# Patient Record
Sex: MALE | Race: WHITE | Employment: FULL TIME | URBAN - NONMETROPOLITAN AREA
[De-identification: names, ages, dates, MRNs, and addresses within clinical notes are randomized per-mention and may not be internally consistent; named-entity substitution may affect disease eponyms.]

---

## 2018-04-30 LAB
AVERAGE GLUCOSE: NORMAL
CHOLESTEROL, TOTAL: 278 MG/DL
CHOLESTEROL/HDL RATIO: 7.1
CREATININE: 1.2 MG/DL
HBA1C MFR BLD: 6.2 %
HDLC SERPL-MCNC: 39 MG/DL (ref 35–70)
LDL CHOLESTEROL CALCULATED: 198 MG/DL (ref 0–160)
POTASSIUM (K+): 4.1
TRIGL SERPL-MCNC: 218 MG/DL
VLDLC SERPL CALC-MCNC: ABNORMAL MG/DL

## 2019-06-25 SDOH — HEALTH STABILITY: MENTAL HEALTH: HOW OFTEN DO YOU HAVE A DRINK CONTAINING ALCOHOL?: MONTHLY OR LESS

## 2019-06-25 SDOH — HEALTH STABILITY: MENTAL HEALTH: HOW MANY STANDARD DRINKS CONTAINING ALCOHOL DO YOU HAVE ON A TYPICAL DAY?: 1 OR 2

## 2019-07-08 VITALS
HEIGHT: 70 IN | HEART RATE: 70 BPM | SYSTOLIC BLOOD PRESSURE: 122 MMHG | WEIGHT: 240 LBS | DIASTOLIC BLOOD PRESSURE: 70 MMHG | BODY MASS INDEX: 34.36 KG/M2

## 2019-07-08 RX ORDER — ALPRAZOLAM 0.25 MG/1
0.25 TABLET ORAL 3 TIMES DAILY PRN
COMMUNITY
End: 2019-07-09 | Stop reason: SDUPTHER

## 2019-07-09 ENCOUNTER — OFFICE VISIT (OUTPATIENT)
Dept: PRIMARY CARE CLINIC | Age: 54
End: 2019-07-09
Payer: COMMERCIAL

## 2019-07-09 VITALS
SYSTOLIC BLOOD PRESSURE: 122 MMHG | RESPIRATION RATE: 16 BRPM | BODY MASS INDEX: 34.07 KG/M2 | WEIGHT: 238 LBS | OXYGEN SATURATION: 98 % | HEIGHT: 70 IN | DIASTOLIC BLOOD PRESSURE: 80 MMHG | TEMPERATURE: 98.4 F | HEART RATE: 74 BPM

## 2019-07-09 DIAGNOSIS — E78.2 MIXED HYPERLIPIDEMIA: ICD-10-CM

## 2019-07-09 DIAGNOSIS — Z51.81 ENCOUNTER FOR MEDICATION MONITORING: ICD-10-CM

## 2019-07-09 DIAGNOSIS — G47.33 OBSTRUCTIVE SLEEP APNEA SYNDROME: ICD-10-CM

## 2019-07-09 DIAGNOSIS — E29.1 HYPOGONADISM IN MALE: ICD-10-CM

## 2019-07-09 DIAGNOSIS — F41.9 ANXIETY: Primary | ICD-10-CM

## 2019-07-09 PROCEDURE — 99214 OFFICE O/P EST MOD 30 MIN: CPT | Performed by: INTERNAL MEDICINE

## 2019-07-09 RX ORDER — ALPRAZOLAM 0.25 MG/1
0.25 TABLET ORAL 3 TIMES DAILY PRN
Qty: 90 TABLET | Refills: 2 | Status: SHIPPED | OUTPATIENT
Start: 2019-07-09 | End: 2019-08-08

## 2019-07-09 ASSESSMENT — PATIENT HEALTH QUESTIONNAIRE - PHQ9
1. LITTLE INTEREST OR PLEASURE IN DOING THINGS: 1
SUM OF ALL RESPONSES TO PHQ9 QUESTIONS 1 & 2: 1
2. FEELING DOWN, DEPRESSED OR HOPELESS: 0
SUM OF ALL RESPONSES TO PHQ QUESTIONS 1-9: 1
SUM OF ALL RESPONSES TO PHQ QUESTIONS 1-9: 1

## 2019-07-09 ASSESSMENT — ENCOUNTER SYMPTOMS
COLOR CHANGE: 0
CHEST TIGHTNESS: 0
DIARRHEA: 0
NAUSEA: 0
BACK PAIN: 0
CONSTIPATION: 0
SHORTNESS OF BREATH: 0
ABDOMINAL PAIN: 0

## 2019-07-09 NOTE — PROGRESS NOTES
tightness and shortness of breath. Cardiovascular: Negative for chest pain, palpitations and leg swelling. Gastrointestinal: Negative for abdominal pain, constipation, diarrhea and nausea. Endocrine: Negative for cold intolerance and heat intolerance. Genitourinary: Negative for difficulty urinating, dysuria, hematuria and urgency. Musculoskeletal: Negative for arthralgias and back pain. Skin: Negative for color change and pallor. Neurological: Negative for dizziness, seizures, weakness, light-headedness, numbness and headaches. Hematological: Does not bruise/bleed easily. Psychiatric/Behavioral: Negative for confusion and sleep disturbance. The patient is nervous/anxious. Current Outpatient Medications:     ALPRAZolam (XANAX) 0.25 MG tablet, Take 1 tablet by mouth 3 times daily as needed for Sleep or Anxiety for up to 30 days. , Disp: 90 tablet, Rfl: 2    Multiple Vitamin (MULTI-DAY PO), Take by mouth, Disp: , Rfl:   Medications Discontinued During This Encounter   Medication Reason    ALPRAZolam (XANAX) 0.25 MG tablet REORDER       Allergies   Allergen Reactions    Buspar [Buspirone]     Lexapro  [Escitalopram Oxalate]     Venlafaxine     Zoloft  [Sertraline Hcl]        Past Medical History:   Diagnosis Date    Anxiety     Depression     Erectile dysfunction     Hyperlipidemia     Hypogonadism in male     Sleep apnea         There is no immunization history on file for this patient. Vitals:    07/09/19 0745   BP: 122/80   Site: Left Upper Arm   Position: Sitting   Cuff Size: Large Adult   Pulse: 74   Resp: 16   Temp: 98.4 °F (36.9 °C)   TempSrc: Temporal   SpO2: 98%   Weight: 238 lb (108 kg)   Height: 5' 10\" (1.778 m)       Physical Exam   Constitutional: He is oriented to person, place, and time. He appears well-developed and well-nourished. No distress. HENT:   Head: Normocephalic and atraumatic.    Right Ear: External ear normal.   Left Ear: External ear normal.

## 2019-10-01 RX ORDER — MULTIVITAMIN WITH IRON
250 TABLET ORAL DAILY
COMMUNITY
End: 2019-10-09

## 2019-10-01 RX ORDER — ALPRAZOLAM 0.25 MG/1
0.25 TABLET ORAL 3 TIMES DAILY PRN
COMMUNITY
End: 2019-10-09 | Stop reason: SDUPTHER

## 2019-10-08 ASSESSMENT — ENCOUNTER SYMPTOMS
BACK PAIN: 0
DIARRHEA: 0
CONSTIPATION: 0
COLOR CHANGE: 0
CHEST TIGHTNESS: 0
NAUSEA: 0
ABDOMINAL PAIN: 0
SHORTNESS OF BREATH: 0

## 2019-10-09 ENCOUNTER — OFFICE VISIT (OUTPATIENT)
Dept: PRIMARY CARE CLINIC | Age: 54
End: 2019-10-09
Payer: COMMERCIAL

## 2019-10-09 VITALS
SYSTOLIC BLOOD PRESSURE: 116 MMHG | WEIGHT: 235 LBS | RESPIRATION RATE: 16 BRPM | TEMPERATURE: 98 F | BODY MASS INDEX: 33.64 KG/M2 | DIASTOLIC BLOOD PRESSURE: 74 MMHG | OXYGEN SATURATION: 96 % | HEIGHT: 70 IN | HEART RATE: 80 BPM

## 2019-10-09 DIAGNOSIS — G47.33 OBSTRUCTIVE SLEEP APNEA SYNDROME: Primary | ICD-10-CM

## 2019-10-09 DIAGNOSIS — F41.9 ANXIETY: ICD-10-CM

## 2019-10-09 DIAGNOSIS — E78.2 MIXED HYPERLIPIDEMIA: ICD-10-CM

## 2019-10-09 DIAGNOSIS — Z12.5 SCREENING FOR PROSTATE CANCER: ICD-10-CM

## 2019-10-09 PROCEDURE — 99213 OFFICE O/P EST LOW 20 MIN: CPT | Performed by: INTERNAL MEDICINE

## 2019-10-09 RX ORDER — ALPRAZOLAM 0.25 MG/1
0.25 TABLET ORAL 3 TIMES DAILY PRN
Qty: 90 TABLET | Refills: 2 | Status: SHIPPED | OUTPATIENT
Start: 2019-10-09 | End: 2020-01-07

## 2019-11-08 PROBLEM — Z12.5 SCREENING FOR PROSTATE CANCER: Status: RESOLVED | Noted: 2019-10-09 | Resolved: 2019-11-08

## 2020-01-09 PROBLEM — N52.9 ERECTILE DYSFUNCTION: Status: ACTIVE | Noted: 2020-01-09

## 2020-01-29 ASSESSMENT — ENCOUNTER SYMPTOMS
COLOR CHANGE: 0
CONSTIPATION: 0
NAUSEA: 0
CHEST TIGHTNESS: 0
SHORTNESS OF BREATH: 0
BACK PAIN: 0
ABDOMINAL PAIN: 0
DIARRHEA: 0

## 2020-01-29 NOTE — PROGRESS NOTES
19    Name: Mildred Casillas     :1965      Sex:male       Age : 49 y.o. Chief Complaint   Patient presents with    Hyperlipidemia     3 MO CHECK UP ; Patient is fasting.  Anxiety     medication refill        This 47y.o. -year-old male  presents today for evaluation and management of his  chronic medical problems. Current medication list reviewed. The patient is tolerating all medications well without adverse events or known side effects. The patient does understand the risk and benefits of the prescribed medications. Patient has a history of anxiety. He texted takes alprazolam on a regular basis 0.25 mg 3 times daily. We discussed the use of medications such as Cymbalta or Effexor which he says he can take based on his gene study. Other ones that he is not supposed to take include all the regular ones such as Wellbutrin Zoloft Paxil Prozac Lexapro and Celexa. He signed his medication agreement. This was reviewed with him. We reviewed his controlled substance monitoring. He signed his medication agreement. He is aware of the possible medication side effects tolerance, addiction. He does not want to go on any other medications at this time. No sign of drug seeking or diversion identified. We sent a random urine drug screen today. [place] report reviewed and the last time he had his prescription filled was on  2019       He did some type of gene sight test for psychotropic medications and he gave me a copy of the report. Alprazolam was 1 of the medications that was acceptable. He did have a decreased folic acid level according to him and so  he started on over-the-counter folate  Daily. Elieser Quirozots He does not want to have a colonoscopy. He did FIT tests within the year. He last saw his urologist about a year ago. He had a penile implant about a year ago which works well. He has a history of obstructive sleep apnea and uses a CPAP nightly  .   Patient refuses flu shot and colonoscopy        Review of Systems   Constitutional: Negative for fatigue. Respiratory: Negative for chest tightness and shortness of breath. Cardiovascular: Negative for chest pain, palpitations and leg swelling. Gastrointestinal: Negative for abdominal pain, constipation, diarrhea and nausea. Endocrine: Negative for cold intolerance and heat intolerance. Genitourinary: Negative for difficulty urinating, dysuria, hematuria and urgency. Musculoskeletal: Negative for arthralgias and back pain. Skin: Negative for color change and pallor. Neurological: Negative for dizziness, seizures, weakness, light-headedness, numbness and headaches. Hematological: Does not bruise/bleed easily. Psychiatric/Behavioral: Negative for confusion and sleep disturbance. The patient is nervous/anxious. Current Outpatient Medications:     NONFORMULARY, CBD oil, Disp: , Rfl:     ALPRAZolam (XANAX) 0.25 MG tablet, Take 1 tablet by mouth 3 times daily as needed for Anxiety for up to 30 days. , Disp: 90 tablet, Rfl: 3    Multiple Vitamin (MULTI-DAY PO), Take by mouth, Disp: , Rfl:   Medications Discontinued During This Encounter   Medication Reason    ALPRAZolam (XANAX) 0.25 MG tablet REORDER    ALPRAZolam (XANAX) 0.25 MG tablet        Allergies   Allergen Reactions    Buspar [Buspirone]     Lexapro  [Escitalopram Oxalate]     Venlafaxine     Zoloft  [Sertraline Hcl]        Past Medical History:   Diagnosis Date    Anxiety     Depression     Erectile dysfunction     Hyperlipidemia     Hypogonadism in male     Sleep apnea         There is no immunization history on file for this patient. Vitals:    01/30/20 0805   BP: 128/84   Site: Left Upper Arm   Position: Sitting   Cuff Size: Large Adult   Pulse: 84   Resp: 16   Temp: 98 °F (36.7 °C)   TempSrc: Temporal   SpO2: 98%   Weight: 237 lb 3.2 oz (107.6 kg)   Height: 5' 10\" (1.778 m)       Physical Exam  Vitals signs reviewed.    Constitutional:

## 2020-01-30 ENCOUNTER — OFFICE VISIT (OUTPATIENT)
Dept: PRIMARY CARE CLINIC | Age: 55
End: 2020-01-30
Payer: COMMERCIAL

## 2020-01-30 VITALS
HEIGHT: 70 IN | DIASTOLIC BLOOD PRESSURE: 84 MMHG | BODY MASS INDEX: 33.96 KG/M2 | OXYGEN SATURATION: 98 % | SYSTOLIC BLOOD PRESSURE: 128 MMHG | WEIGHT: 237.2 LBS | HEART RATE: 84 BPM | TEMPERATURE: 98 F | RESPIRATION RATE: 16 BRPM

## 2020-01-30 PROCEDURE — 99213 OFFICE O/P EST LOW 20 MIN: CPT | Performed by: INTERNAL MEDICINE

## 2020-01-30 RX ORDER — ALPRAZOLAM 0.25 MG/1
1 TABLET ORAL 3 TIMES DAILY PRN
COMMUNITY
Start: 2020-01-12 | End: 2020-01-30 | Stop reason: SDUPTHER

## 2020-01-30 RX ORDER — ALPRAZOLAM 0.25 MG/1
0.25 TABLET ORAL 3 TIMES DAILY PRN
Qty: 90 TABLET | Refills: 0 | Status: SHIPPED | OUTPATIENT
Start: 2020-01-30 | End: 2020-01-30

## 2020-01-30 RX ORDER — ALPRAZOLAM 0.25 MG/1
0.25 TABLET ORAL 3 TIMES DAILY PRN
Qty: 90 TABLET | Refills: 3 | Status: SHIPPED | OUTPATIENT
Start: 2020-01-30 | End: 2020-02-29

## 2020-01-30 ASSESSMENT — PATIENT HEALTH QUESTIONNAIRE - PHQ9
1. LITTLE INTEREST OR PLEASURE IN DOING THINGS: 1
SUM OF ALL RESPONSES TO PHQ QUESTIONS 1-9: 2
SUM OF ALL RESPONSES TO PHQ QUESTIONS 1-9: 2
SUM OF ALL RESPONSES TO PHQ9 QUESTIONS 1 & 2: 2
2. FEELING DOWN, DEPRESSED OR HOPELESS: 1

## 2020-02-29 PROBLEM — Z12.5 SCREENING FOR PROSTATE CANCER: Status: RESOLVED | Noted: 2019-10-09 | Resolved: 2020-02-29

## 2020-04-30 ENCOUNTER — VIRTUAL VISIT (OUTPATIENT)
Dept: PRIMARY CARE CLINIC | Age: 55
End: 2020-04-30
Payer: COMMERCIAL

## 2020-04-30 PROCEDURE — 99213 OFFICE O/P EST LOW 20 MIN: CPT | Performed by: INTERNAL MEDICINE

## 2020-04-30 RX ORDER — ALPRAZOLAM 0.25 MG/1
TABLET ORAL
Qty: 90 TABLET | Refills: 2 | Status: SHIPPED | OUTPATIENT
Start: 2020-04-30 | End: 2020-05-30

## 2020-04-30 RX ORDER — ALPRAZOLAM 0.25 MG/1
TABLET ORAL
COMMUNITY
Start: 2020-04-13 | End: 2020-04-30 | Stop reason: SDUPTHER

## 2020-04-30 ASSESSMENT — ENCOUNTER SYMPTOMS
SHORTNESS OF BREATH: 0
CHEST TIGHTNESS: 0
DIARRHEA: 0
ABDOMINAL PAIN: 0
CONSTIPATION: 0
COLOR CHANGE: 0
NAUSEA: 0
BACK PAIN: 0

## 2020-04-30 NOTE — PROGRESS NOTES
19    Name: Candy Valdez     :1965      Sex:male       Age : 49 y.o. Chief Complaint   Patient presents with    Sleep Apnea        This 47y.o. -year-old male  presents today for evaluation and management of his  chronic medical problems. Current medication list reviewed. The patient is tolerating all medications well without adverse events or known side effects. The patient does understand the risk and benefits of the prescribed medications. Patient has a history of anxiety. He  takes alprazolam on a regular basis 0.25 mg 3 times daily. We discussed the use of medications such as Cymbalta or Effexor which he says he can take based on his gene study. Other ones that he is not supposed to take include all the regular ones such as Wellbutrin Zoloft Paxil Prozac Lexapro and Celexa. He does not want to take an SSRI or SNRI. He signed his medication agreement. We reviewed his controlled substance monitoring. He signed his medication agreement. He is aware of the possible medication side effects tolerance, addiction. He does not want to go on any other medications at this time. No sign of drug seeking or diversion identified. [place] report reviewed and the last time he had his prescription filled was on  2020. He did some type of gene sight test for psychotropic medications and he gave me a copy of the report. Alprazolam was 1 of the medications that was acceptable. He did have a decreased folic acid level according to him and so  he started on over-the-counter folate  Daily. Nathanael Mar He does not want to have a colonoscopy. He did FIT tests within the year. He last saw his urologist about a year ago. He had a penile implant about a year ago which works well. He has a history of obstructive sleep apnea and uses a CPAP nightly    He has a history of hyperlipidemia and we need to check his lipids  .   Patient refuses flu shot and colonoscopy    He will return within the month for fasting blood work. Review of Systems   Constitutional: Negative for fatigue. Respiratory: Negative for chest tightness and shortness of breath. Cardiovascular: Negative for chest pain, palpitations and leg swelling. Gastrointestinal: Negative for abdominal pain, constipation, diarrhea and nausea. Endocrine: Negative for cold intolerance and heat intolerance. Genitourinary: Negative for difficulty urinating, dysuria, hematuria and urgency. Musculoskeletal: Negative for arthralgias and back pain. Skin: Negative for color change and pallor. Neurological: Negative for dizziness, seizures, weakness, light-headedness, numbness and headaches. Hematological: Does not bruise/bleed easily. Psychiatric/Behavioral: Negative for confusion and sleep disturbance. The patient is not nervous/anxious. Current Outpatient Medications:     ALPRAZolam (XANAX) 0.25 MG tablet, TAKE 1 TABLET BY MOUTH 3 TIMES DAILY AS NEEDED FOR ANXIETY FOR UP TO 30 DAYS., Disp: 90 tablet, Rfl: 2    NONFORMULARY, CBD oil, Disp: , Rfl:     Multiple Vitamin (MULTI-DAY PO), Take by mouth, Disp: , Rfl:   Medications Discontinued During This Encounter   Medication Reason    ALPRAZolam (XANAX) 0.25 MG tablet REORDER       Allergies   Allergen Reactions    Buspar [Buspirone]     Lexapro  [Escitalopram Oxalate]     Venlafaxine     Zoloft  [Sertraline Hcl]        Past Medical History:   Diagnosis Date    Anxiety     Erectile dysfunction     Hyperlipidemia     Hypogonadism in male     Sleep apnea       Patient refuses all immunizations    There were no vitals filed for this visit. Physical Examination:    Constitutional:  He is alert, oriented x3, he is in no acute distress. HEENT:  Head is normal and atraumatic  EOM, no sign of eye discharge, sclerae normal  Neck no masses    Pulmonary:  No sign of respiratory distress.   He was not coughing    Skin:  No facial lesions    Neurological:  Gaze is substitute for in-person clinic visit. Patient and provider were located at their individual homes. Visit performed with doxy. me    --Eric Sutherland DO on 4/30/2020 at 8:51 AM    An electronic signature was used to authenticate this note.

## 2020-05-11 PROBLEM — M54.12 CERVICAL RADICULITIS: Status: ACTIVE | Noted: 2020-05-11

## 2020-05-11 RX ORDER — METHYLPREDNISOLONE 4 MG/1
TABLET ORAL
Qty: 1 KIT | Refills: 0 | Status: SHIPPED | OUTPATIENT
Start: 2020-05-11 | End: 2020-05-17

## 2020-05-30 PROBLEM — Z12.5 SCREENING FOR PROSTATE CANCER: Status: RESOLVED | Noted: 2019-10-09 | Resolved: 2020-05-30

## 2020-07-29 ASSESSMENT — ENCOUNTER SYMPTOMS
SHORTNESS OF BREATH: 0
BACK PAIN: 0
CONSTIPATION: 0
CHEST TIGHTNESS: 0
NAUSEA: 0
COLOR CHANGE: 0
ABDOMINAL PAIN: 0

## 2020-07-29 NOTE — PROGRESS NOTES
19    Name: Avi Goodman     :1965      Sex:male       Age : 49 y.o. Chief Complaint   Patient presents with    Sleep Apnea        This 47y.o. -year-old male  presents today for evaluation and management of his  chronic medical problems. Current medication list reviewed. The patient is tolerating all medications well without adverse events or known side effects. The patient does understand the risk and benefits of the prescribed medications. Patient has a history of anxiety. He texted takes alprazolam on a regular basis 0.25 mg 3 times daily. We discussed the use of medications such as Cymbalta or Effexor which he says he can take based on his gene study. Other ones that he is not supposed to take include all the regular ones such as Wellbutrin Zoloft Paxil Prozac Lexapro and Celexa. He signed his medication agreement. This was reviewed with him. We reviewed his controlled substance monitoring. He signed his medication agreement. He is aware of the possible medication side effects tolerance, addiction. He does not want to go on any other medications at this time. No sign of drug seeking or diversion identified. Hermila Camarillo He did some type of gene sight test for psychotropic medications and he gave me a copy of the report. Alprazolam was 1 of the medications that was acceptable. He did have a decreased folic acid level according to him and so  he started on over-the-counter folate  Daily    He has a history of anxiety mainly on the days that he works. He uses his alprazolam..As needed. [place] report reviewed. Last fill was on 2020. Of 90 pills. He does not want to have a colonoscopy. He did FIT tests within the year. He last saw his urologist about a year ago. He had a penile implant about a year ago which works well. He has a history of obstructive sleep apnea and uses a CPAP nightly  .   Patient refuses flu shot and colonoscopy    He had problems with his left ear plugging up. Over the last several weeks. He tried some Debrox which did not  help. Right ear is normal.  There is minimal left ear pain. He also has diarrhea over the last 2 weeks. No previous antibiotic therapy. No fever, chills, nausea or vomiting. He has minimal abdominal pain more like cramping and having a bowel movement usually relieves this pain. His stools are combination of loose and formed. He goes the bathroom about 2-3 times a day. No previous history of lactose or gluten intolerance. Review of Systems   Constitutional: Negative for fatigue. HENT: Positive for ear pain. Respiratory: Negative for chest tightness and shortness of breath. Cardiovascular: Negative for chest pain, palpitations and leg swelling. Gastrointestinal: Positive for diarrhea. Negative for abdominal pain, constipation and nausea. Endocrine: Negative for cold intolerance and heat intolerance. Genitourinary: Negative for difficulty urinating, dysuria, hematuria and urgency. Musculoskeletal: Negative for arthralgias and back pain. Skin: Negative for color change and pallor. Neurological: Negative for dizziness, seizures, weakness, light-headedness, numbness and headaches. Hematological: Does not bruise/bleed easily. Psychiatric/Behavioral: Negative for confusion and sleep disturbance. The patient is nervous/anxious.            Current Outpatient Medications:     ALPRAZolam (XANAX) 0.25 MG tablet, TAKE 1 TABLET BY MOUTH 3 TIMES DAILY AS NEEDED FOR ANXIETY FOR UP TO 30 DAYS., Disp: 90 tablet, Rfl: 2    dicyclomine (BENTYL) 10 MG capsule, Take 1 capsule by mouth 4 times daily Prn, Disp: 40 capsule, Rfl: 1    ciprofloxacin-hydrocortisone (CIPRO HC) 0.2-1 % otic suspension, Place 3 drops in ear(s) 2 times daily for 7 days, Disp: 1 Bottle, Rfl: 1    NONFORMULARY, CBD oil, Disp: , Rfl:     Multiple Vitamin (MULTI-DAY PO), Take by mouth, Disp: , Rfl:   Medications Discontinued During This Encounter   Medication Reason    ALPRAZolam (XANAX) 0.25 MG tablet REORDER       Allergies   Allergen Reactions    Buspar [Buspirone]     Lexapro  [Escitalopram Oxalate]     Venlafaxine     Zoloft  [Sertraline Hcl]        Past Medical History:   Diagnosis Date    Anxiety     Erectile dysfunction     Hyperlipidemia     Hypogonadism in male     Sleep apnea         There is no immunization history on file for this patient. Vitals:    07/30/20 0657   BP: 105/68   Pulse: 86   Temp: 97.3 °F (36.3 °C)   TempSrc: Temporal   SpO2: 96%   Weight: 235 lb (106.6 kg)   Height: 5' 10\" (1.778 m)       Physical Exam  Vitals signs reviewed. Constitutional:       General: He is not in acute distress. Appearance: He is well-developed. HENT:      Head: Normocephalic and atraumatic. Right Ear: Tympanic membrane, ear canal and external ear normal.      Left Ear: External ear normal.      Ears:      Comments: EAC is excoriated and red. TMs slightly retracted. Eyes:      Pupils: Pupils are equal, round, and reactive to light. Neck:      Musculoskeletal: Normal range of motion and neck supple. Thyroid: Thyromegaly present. Vascular: No JVD. Cardiovascular:      Rate and Rhythm: Normal rate and regular rhythm. Heart sounds: No murmur. Pulmonary:      Effort: Pulmonary effort is normal.      Breath sounds: Normal breath sounds. No wheezing or rales. Abdominal:      General: Bowel sounds are normal. There is no distension. Palpations: Abdomen is soft. Tenderness: There is no abdominal tenderness. Musculoskeletal: Normal range of motion. General: No deformity. Lymphadenopathy:      Cervical: No cervical adenopathy. Skin:     General: Skin is warm and dry. Coloration: Skin is not pale. Findings: No erythema. Neurological:      Mental Status: He is alert and oriented to person, place, and time. Motor: No abnormal muscle tone.       Deep Tendon Reflexes: Reflexes normal.   Psychiatric:         Behavior: Behavior normal.         Thought Content: Thought content normal.         Judgment: Judgment normal.          Problem List        Respiratory    Sleep apnea     Continue using his CPAP daily. It really helps him            Digestive    Irritable bowel syndrome with diarrhea      investigate if certain foods cause or worsen the diarrhea. We will try  dicyclomine 10 mg 4 times daily as needed abdominal pain or diarrhea. If the diarrhea persists over the next couple of weeks let me know and I will make a GI consult. Relevant Medications    dicyclomine (BENTYL) 10 MG capsule       Nervous and Auditory    Otitis externa     Trial Claritin-D for possible he is taking tube problems. Cipro otic 3 drops twice a day for 7 days in the left ear. If no improvement sent to ENT         Relevant Medications    ciprofloxacin-hydrocortisone (CIPRO HC) 0.2-1 % otic suspension       Other    Anxiety - Primary     OARRS report reviewed. Prescription management. Relevant Medications    ALPRAZolam (XANAX) 0.25 MG tablet    Hyperlipidemia     Watch saturated fats in diet and will monitor lipids         Relevant Orders    Lipid Panel    Comprehensive Metabolic Panel    Screening for prostate cancer     Check PSA         Relevant Orders    Psa screening    Hyperglycemia     Check hemoglobin A1c         Relevant Orders    Comprehensive Metabolic Panel    Hemoglobin A1C        Return in 3 months for medication refill and monitoring.     Seen by:   Jackie Lopez DO

## 2020-07-30 ENCOUNTER — HOSPITAL ENCOUNTER (OUTPATIENT)
Age: 55
Discharge: HOME | End: 2020-08-01
Payer: COMMERCIAL

## 2020-07-30 ENCOUNTER — OFFICE VISIT (OUTPATIENT)
Dept: PRIMARY CARE CLINIC | Age: 55
End: 2020-07-30
Payer: COMMERCIAL

## 2020-07-30 VITALS
SYSTOLIC BLOOD PRESSURE: 105 MMHG | HEART RATE: 86 BPM | TEMPERATURE: 97.3 F | BODY MASS INDEX: 33.64 KG/M2 | DIASTOLIC BLOOD PRESSURE: 68 MMHG | HEIGHT: 70 IN | OXYGEN SATURATION: 96 % | WEIGHT: 235 LBS

## 2020-07-30 PROBLEM — R73.9 HYPERGLYCEMIA: Status: ACTIVE | Noted: 2020-07-30

## 2020-07-30 PROBLEM — K58.0 IRRITABLE BOWEL SYNDROME WITH DIARRHEA: Status: ACTIVE | Noted: 2020-07-30

## 2020-07-30 PROBLEM — H60.90 OTITIS EXTERNA: Status: ACTIVE | Noted: 2020-07-30

## 2020-07-30 LAB
ALBUMIN SERPL-MCNC: 4.3 G/DL (ref 3.5–5.2)
ALP BLD-CCNC: 66 U/L (ref 40–129)
ALT SERPL-CCNC: 23 U/L (ref 0–40)
ANION GAP SERPL CALCULATED.3IONS-SCNC: 19 MMOL/L (ref 7–16)
AST SERPL-CCNC: 17 U/L (ref 0–39)
BILIRUB SERPL-MCNC: 0.4 MG/DL (ref 0–1.2)
BUN BLDV-MCNC: 17 MG/DL (ref 6–20)
CALCIUM SERPL-MCNC: 9.2 MG/DL (ref 8.6–10.2)
CHLORIDE BLD-SCNC: 101 MMOL/L (ref 98–107)
CHOLESTEROL, TOTAL: 261 MG/DL (ref 0–199)
CO2: 21 MMOL/L (ref 22–29)
CREAT SERPL-MCNC: 1.2 MG/DL (ref 0.7–1.2)
GFR AFRICAN AMERICAN: >60
GFR NON-AFRICAN AMERICAN: >60 ML/MIN/1.73
GLUCOSE BLD-MCNC: 138 MG/DL (ref 74–99)
HBA1C MFR BLD: 7.1 % (ref 4–5.6)
HDLC SERPL-MCNC: 42 MG/DL
LDL CHOLESTEROL CALCULATED: 190 MG/DL (ref 0–99)
POTASSIUM SERPL-SCNC: 4 MMOL/L (ref 3.5–5)
PROSTATE SPECIFIC ANTIGEN: 0.37 NG/ML (ref 0–4)
SODIUM BLD-SCNC: 141 MMOL/L (ref 132–146)
TOTAL PROTEIN: 7.6 G/DL (ref 6.4–8.3)
TRIGL SERPL-MCNC: 144 MG/DL (ref 0–149)
VLDLC SERPL CALC-MCNC: 29 MG/DL

## 2020-07-30 PROCEDURE — 83036 HEMOGLOBIN GLYCOSYLATED A1C: CPT

## 2020-07-30 PROCEDURE — 80061 LIPID PANEL: CPT

## 2020-07-30 PROCEDURE — G0103 PSA SCREENING: HCPCS

## 2020-07-30 PROCEDURE — 99213 OFFICE O/P EST LOW 20 MIN: CPT | Performed by: INTERNAL MEDICINE

## 2020-07-30 PROCEDURE — 80053 COMPREHEN METABOLIC PANEL: CPT

## 2020-07-30 PROCEDURE — 36415 COLL VENOUS BLD VENIPUNCTURE: CPT

## 2020-07-30 RX ORDER — DICYCLOMINE HYDROCHLORIDE 10 MG/1
10 CAPSULE ORAL 4 TIMES DAILY
Qty: 40 CAPSULE | Refills: 1 | Status: SHIPPED
Start: 2020-07-30 | End: 2020-10-21 | Stop reason: ALTCHOICE

## 2020-07-30 RX ORDER — ALPRAZOLAM 0.25 MG/1
TABLET ORAL
COMMUNITY
Start: 2020-06-27 | End: 2020-07-30 | Stop reason: SDUPTHER

## 2020-07-30 RX ORDER — CIPROFLOXACIN/HYDROCORTISONE 0.2 %-1 %
3 SUSPENSION, DROPS(FINAL DOSAGE FORM)(ML) OTIC (EAR) 2 TIMES DAILY
Qty: 1 BOTTLE | Refills: 1 | Status: SHIPPED | OUTPATIENT
Start: 2020-07-30 | End: 2020-08-06

## 2020-07-30 RX ORDER — ALPRAZOLAM 0.25 MG/1
TABLET ORAL
Qty: 90 TABLET | Refills: 2 | Status: SHIPPED | OUTPATIENT
Start: 2020-07-30 | End: 2020-08-30

## 2020-07-30 ASSESSMENT — ENCOUNTER SYMPTOMS: DIARRHEA: 1

## 2020-07-30 NOTE — ASSESSMENT & PLAN NOTE
investigate if certain foods cause or worsen the diarrhea. We will try  dicyclomine 10 mg 4 times daily as needed abdominal pain or diarrhea. If the diarrhea persists over the next couple of weeks let me know and I will make a GI consult.

## 2020-07-30 NOTE — ASSESSMENT & PLAN NOTE
Trial Claritin-D for possible he is taking tube problems. Cipro otic 3 drops twice a day for 7 days in the left ear.   If no improvement sent to ENT

## 2020-08-29 PROBLEM — Z12.5 SCREENING FOR PROSTATE CANCER: Status: RESOLVED | Noted: 2019-10-09 | Resolved: 2020-08-29

## 2020-10-21 ENCOUNTER — OFFICE VISIT (OUTPATIENT)
Dept: PRIMARY CARE CLINIC | Age: 55
End: 2020-10-21
Payer: COMMERCIAL

## 2020-10-21 VITALS
WEIGHT: 226 LBS | BODY MASS INDEX: 32.43 KG/M2 | OXYGEN SATURATION: 97 % | HEART RATE: 81 BPM | SYSTOLIC BLOOD PRESSURE: 122 MMHG | TEMPERATURE: 98.5 F | DIASTOLIC BLOOD PRESSURE: 80 MMHG

## 2020-10-21 PROBLEM — H60.90 OTITIS EXTERNA: Status: RESOLVED | Noted: 2020-07-30 | Resolved: 2020-10-21

## 2020-10-21 PROBLEM — E11.65 TYPE 2 DIABETES MELLITUS WITH HYPERGLYCEMIA, WITHOUT LONG-TERM CURRENT USE OF INSULIN (HCC): Status: ACTIVE | Noted: 2020-10-21

## 2020-10-21 PROCEDURE — 99214 OFFICE O/P EST MOD 30 MIN: CPT | Performed by: INTERNAL MEDICINE

## 2020-10-21 PROCEDURE — 3051F HG A1C>EQUAL 7.0%<8.0%: CPT | Performed by: INTERNAL MEDICINE

## 2020-10-21 RX ORDER — FLUOROMETHOLONE 0.1 %
SUSPENSION, DROPS(FINAL DOSAGE FORM)(ML) OPHTHALMIC (EYE)
COMMUNITY
Start: 2020-10-12 | End: 2021-04-21 | Stop reason: ALTCHOICE

## 2020-10-21 RX ORDER — ALPRAZOLAM 0.25 MG/1
TABLET ORAL
Qty: 90 TABLET | Refills: 2 | Status: SHIPPED
Start: 2020-10-21 | End: 2021-01-21 | Stop reason: SDUPTHER

## 2020-10-21 RX ORDER — ALPRAZOLAM 0.25 MG/1
TABLET ORAL
COMMUNITY
Start: 2020-10-12 | End: 2020-10-21 | Stop reason: SDUPTHER

## 2020-10-21 ASSESSMENT — ENCOUNTER SYMPTOMS
BACK PAIN: 0
CONSTIPATION: 0
DIARRHEA: 0
COLOR CHANGE: 0
CHEST TIGHTNESS: 0
ABDOMINAL PAIN: 0
NAUSEA: 0
SHORTNESS OF BREATH: 0

## 2020-10-21 NOTE — ASSESSMENT & PLAN NOTE
Watch diet very closely recheck lipid profile.   Would recommend a low-dose statin if his LDL still markedly elevated

## 2020-10-21 NOTE — PROGRESS NOTES
19    Name: Guerita Gutierres     :1965      Sex:male       Age : 48 y.o. Chief Complaint   Patient presents with    Anxiety    Sleep Apnea        This 47y.o. -year-old male  presents today for evaluation and management of his  chronic medical problems. Current medication list reviewed. The patient is tolerating all medications well without adverse events or known side effects. The patient does understand the risk and benefits of the prescribed medications. He was diagnosed with type 2 diabetes mellitus after his last visit. His hemoglobin A1c In 2020 was 7.1% his fasting blood sugar was 138. Ken Mak He opted to try diet and exercise first and is been watching his diet. He has lost 14 pounds since his last office visit. His total cholesterol was 261 with an LDL cholesterol of 190. Triglycerides 144 and HDL 42. I am interested in seeing how this has improved since he has lost weight and started to exercise. He had his diabetic eye examination at [facility] in [place]. We will get reports. He refuses any immunizations including Pneumovax and flu shots. Patient has a history of anxiety. He texted takes alprazolam on a regular basis 0.25 mg 3 times daily. We discussed the use of medications such as Cymbalta or Effexor which he says he can take based on his gene study. Other ones that he is not supposed to take include all the regular ones such as Wellbutrin Zoloft Paxil Prozac Lexapro and Celexa. He signed his medication agreement. This was reviewed with him. We reviewed his controlled substance monitoring. He signed his medication agreement. He is aware of the possible medication side effects tolerance, addiction. He does not want to go on any other medications at this time. No sign of drug seeking or diversion identified. Ken Mak He did some type of gene sight test for psychotropic medications and he gave me a copy of the report.   Alprazolam was 1 of the medications that was acceptable. He did have a decreased folic acid level according to him and so  he started on over-the-counter folate  Daily    He has a history of anxiety mainly on the days that he works. He uses his alprazolam..As needed. OARRS report reviewed. Last fill was on 10/12//2020. Of 90 pills. He does not want to have a colonoscopy. He did FIT tests within the year. He last saw his urologist about a year ago. He had a penile implant about a year ago which works well. He has a history of obstructive sleep apnea and uses a CPAP nightly  . Patient refuses flu shot and colonoscopy    He had problems with his left ear plugging up. Over the last several weeks. He tried some Debrox which did not  help. Right ear is normal.  There is minimal left ear pain. Review of Systems   Constitutional: Negative for fatigue. Respiratory: Negative for chest tightness and shortness of breath. Cardiovascular: Negative for chest pain, palpitations and leg swelling. Gastrointestinal: Negative for abdominal pain, constipation, diarrhea and nausea. Endocrine: Negative for cold intolerance and heat intolerance. Genitourinary: Negative for difficulty urinating, dysuria, hematuria and urgency. Musculoskeletal: Negative for arthralgias and back pain. Skin: Negative for color change and pallor. Neurological: Negative for dizziness, seizures, weakness, light-headedness, numbness and headaches. Hematological: Does not bruise/bleed easily. Psychiatric/Behavioral: Negative for confusion and sleep disturbance. The patient is not nervous/anxious.            Current Outpatient Medications:     fluorometholone (FML) 0.1 % ophthalmic suspension, INSTILL ONE DROP INTO EACH EYE THREE TIMES DAILY FOR 14 DAYS, Disp: , Rfl:     ALPRAZolam (XANAX) 0.25 MG tablet, TAKE 1 TABLET BY MOUTH 3 TIMES DAILY AS NEEDED FOR ANXIETY FOR UP TO 30 DAYS, Disp: 90 tablet, Rfl: 2    NONFORMULARY, CBD oil, Disp: , Rfl:    Multiple Vitamin (MULTI-DAY PO), Take by mouth, Disp: , Rfl:   Medications Discontinued During This Encounter   Medication Reason    dicyclomine (BENTYL) 10 MG capsule Therapy completed    ALPRAZolam (XANAX) 0.25 MG tablet REORDER       Allergies   Allergen Reactions    Buspar [Buspirone]     Lexapro  [Escitalopram Oxalate]     Venlafaxine     Zoloft  [Sertraline Hcl]        Past Medical History:   Diagnosis Date    Anxiety     Erectile dysfunction     Hyperlipidemia     Hypogonadism in male     Sleep apnea         There is no immunization history on file for this patient. Vitals:    10/21/20 0658   BP: 122/80   Pulse: 81   Temp: 98.5 °F (36.9 °C)   TempSrc: Temporal   SpO2: 97%   Weight: 226 lb (102.5 kg)       Physical Exam  Vitals signs reviewed. Constitutional:       General: He is not in acute distress. Appearance: Normal appearance. He is well-developed. He is obese. He is not ill-appearing. HENT:      Head: Normocephalic and atraumatic. Right Ear: External ear normal.      Left Ear: External ear normal.   Eyes:      General: No scleral icterus. Right eye: No discharge. Left eye: No discharge. Pupils: Pupils are equal, round, and reactive to light. Neck:      Musculoskeletal: Normal range of motion and neck supple. Thyroid: Thyromegaly present. Vascular: No JVD. Cardiovascular:      Rate and Rhythm: Normal rate and regular rhythm. Heart sounds: No murmur. Pulmonary:      Effort: Pulmonary effort is normal.      Breath sounds: Normal breath sounds. No wheezing or rales. Abdominal:      General: Bowel sounds are normal. There is no distension. Palpations: Abdomen is soft. Tenderness: There is no abdominal tenderness. Musculoskeletal: Normal range of motion. General: No deformity. Lymphadenopathy:      Cervical: No cervical adenopathy. Skin:     General: Skin is warm and dry. Coloration: Skin is not pale. Findings: No erythema. Neurological:      General: No focal deficit present. Mental Status: He is alert and oriented to person, place, and time. Motor: No weakness or abnormal muscle tone. Gait: Gait normal.      Deep Tendon Reflexes: Reflexes normal.   Psychiatric:         Mood and Affect: Mood normal.         Behavior: Behavior normal.         Thought Content: Thought content normal.         Judgment: Judgment normal.          Problem List        Respiratory    Sleep apnea     Uses CPAP nightly with good results            Endocrine    Type 2 diabetes mellitus with hyperglycemia, without long-term current use of insulin (HCC) - Primary     Continue carb consistent diet, continue weight loss and exercise, check hemoglobin A1c. If still above 7% we will need to start on Metformin         Relevant Orders    Hemoglobin A1C    Comprehensive Metabolic Panel    Microalbumin / Creatinine Urine Ratio       Other    Anxiety     Continue alprazolam as directed prescription given with 2 refills         Relevant Medications    ALPRAZolam (XANAX) 0.25 MG tablet    Hyperlipidemia     Watch diet very closely recheck lipid profile. Would recommend a low-dose statin if his LDL still markedly elevated         Relevant Orders    Lipid Panel        Return in 3 months for medication refill and monitoring.     Seen by:   Angel Aggarwal,

## 2020-10-21 NOTE — ASSESSMENT & PLAN NOTE
Continue carb consistent diet, continue weight loss and exercise, check hemoglobin A1c.   If still above 7% we will need to start on Metformin

## 2020-11-27 ENCOUNTER — TELEPHONE (OUTPATIENT)
Dept: PRIMARY CARE CLINIC | Age: 55
End: 2020-11-27

## 2020-11-30 DIAGNOSIS — Z20.822 CLOSE EXPOSURE TO COVID-19 VIRUS: ICD-10-CM

## 2020-12-02 LAB
SARS-COV-2: NOT DETECTED
SOURCE: NORMAL

## 2021-01-21 ENCOUNTER — OFFICE VISIT (OUTPATIENT)
Dept: PRIMARY CARE CLINIC | Age: 56
End: 2021-01-21
Payer: COMMERCIAL

## 2021-01-21 ENCOUNTER — TELEPHONE (OUTPATIENT)
Dept: PRIMARY CARE CLINIC | Age: 56
End: 2021-01-21

## 2021-01-21 VITALS
HEART RATE: 77 BPM | SYSTOLIC BLOOD PRESSURE: 110 MMHG | WEIGHT: 225 LBS | TEMPERATURE: 97.2 F | HEIGHT: 70 IN | OXYGEN SATURATION: 98 % | BODY MASS INDEX: 32.21 KG/M2 | DIASTOLIC BLOOD PRESSURE: 64 MMHG

## 2021-01-21 DIAGNOSIS — E11.65 TYPE 2 DIABETES MELLITUS WITH HYPERGLYCEMIA, WITHOUT LONG-TERM CURRENT USE OF INSULIN (HCC): ICD-10-CM

## 2021-01-21 DIAGNOSIS — Z00.00 ENCOUNTER FOR ANNUAL PHYSICAL EXAM: Primary | ICD-10-CM

## 2021-01-21 DIAGNOSIS — E78.2 MIXED HYPERLIPIDEMIA: ICD-10-CM

## 2021-01-21 DIAGNOSIS — F41.9 ANXIETY: ICD-10-CM

## 2021-01-21 DIAGNOSIS — G47.33 OBSTRUCTIVE SLEEP APNEA SYNDROME: ICD-10-CM

## 2021-01-21 LAB
ALBUMIN SERPL-MCNC: 4.6 G/DL (ref 3.5–5.2)
ALP BLD-CCNC: 79 U/L (ref 40–129)
ALT SERPL-CCNC: 16 U/L (ref 0–40)
ANION GAP SERPL CALCULATED.3IONS-SCNC: 17 MMOL/L (ref 7–16)
AST SERPL-CCNC: 17 U/L (ref 0–39)
BILIRUB SERPL-MCNC: 0.4 MG/DL (ref 0–1.2)
BUN BLDV-MCNC: 19 MG/DL (ref 6–20)
CALCIUM SERPL-MCNC: 9.8 MG/DL (ref 8.6–10.2)
CHLORIDE BLD-SCNC: 101 MMOL/L (ref 98–107)
CHOLESTEROL, TOTAL: 251 MG/DL (ref 0–199)
CO2: 23 MMOL/L (ref 22–29)
CREAT SERPL-MCNC: 1.2 MG/DL (ref 0.7–1.2)
CREATININE URINE: 140 MG/DL (ref 40–278)
GFR AFRICAN AMERICAN: >60
GFR NON-AFRICAN AMERICAN: >60 ML/MIN/1.73
GLUCOSE BLD-MCNC: 116 MG/DL (ref 74–99)
HBA1C MFR BLD: 6.8 % (ref 4–5.6)
HDLC SERPL-MCNC: 44 MG/DL
LDL CHOLESTEROL CALCULATED: 173 MG/DL (ref 0–99)
MICROALBUMIN UR-MCNC: <12 MG/L
MICROALBUMIN/CREAT UR-RTO: ABNORMAL (ref 0–30)
POTASSIUM SERPL-SCNC: 4.2 MMOL/L (ref 3.5–5)
SODIUM BLD-SCNC: 141 MMOL/L (ref 132–146)
TOTAL PROTEIN: 7.8 G/DL (ref 6.4–8.3)
TRIGL SERPL-MCNC: 169 MG/DL (ref 0–149)
VLDLC SERPL CALC-MCNC: 34 MG/DL

## 2021-01-21 PROCEDURE — 99396 PREV VISIT EST AGE 40-64: CPT | Performed by: INTERNAL MEDICINE

## 2021-01-21 RX ORDER — ALPRAZOLAM 0.25 MG/1
TABLET ORAL
Qty: 90 TABLET | Refills: 2 | Status: SHIPPED
Start: 2021-01-21 | End: 2021-04-21 | Stop reason: SDUPTHER

## 2021-01-21 ASSESSMENT — ENCOUNTER SYMPTOMS
SHORTNESS OF BREATH: 0
BACK PAIN: 0
COUGH: 0
ABDOMINAL PAIN: 0
CHEST TIGHTNESS: 0
COLOR CHANGE: 0
DIARRHEA: 0
SORE THROAT: 0
CONSTIPATION: 0
NAUSEA: 0

## 2021-01-21 NOTE — PROGRESS NOTES
19    Name: Alonso Mchugh     :1965      Sex:male       Age : 48 y.o. Chief Complaint   Patient presents with    Diabetes        This 47y.o. -year-old male  presents today for evaluation and management of his  chronic medical problems. Current medication list reviewed. The patient is tolerating all medications well without adverse events or known side effects. The patient does understand the risk and benefits of the prescribed medications. Diabetes  Pertinent negatives for hypoglycemia include no confusion, dizziness, headaches, nervousness/anxiousness, pallor or seizures. Pertinent negatives for diabetes include no chest pain, no fatigue and no weakness. Patient here for his annual physical.    He was diagnosed with type 2 diabetes mellitus after his last visit. His hemoglobin A1c In 2020 was 7.1% his fasting blood sugar was 138. Brett Tapia He opted to try diet and exercise first and is been watching his diet. He has lost 14 pounds since his last office visit. His total cholesterol was 261 with an LDL cholesterol of 190. Triglycerides 144 and HDL 42. I am interested in seeing how this has improved since he has lost weight and started to exercise. He had his diabetic eye examination at [facility] in [place]. We will get reports. He refuses any immunizations including Pneumovax and flu shots. He is willing to get the COVID-19 vaccine    Patient has a history of anxiety. He takes alprazolam on a regular basis 0.25 mg 3 times daily. We discussed the use of medications such as Cymbalta or Effexor which he says he can take based on his gene study. Other ones that he is not supposed to take include all the regular ones such as Wellbutrin Zoloft Paxil Prozac Lexapro and Celexa. He signed his medication agreement. This was reviewed with him. We reviewed his controlled substance monitoring. He signed his medication agreement.   He is aware of the possible medication side effects tolerance, addiction. He does not want to go on any other medications at this time. No sign of drug seeking or diversion identified. Bashirdayanna Ry He did some type of gene sight test for psychotropic medications and he gave me a copy of the report. Alprazolam was 1 of the medications that was acceptable. He did have a decreased folic acid level according to him and so  he started on over-the-counter folate  Daily    He has a history of anxiety mainly on the days that he works. He uses his alprazolam..As needed. OARRS report reviewed. Last fill was on  1/15/2021  Of 90 pills. He does not want to have a colonoscopy. He did FIT tests within the year. He last saw his urologist about a year ago. He had a penile implant about a year ago which works well. He has a history of obstructive sleep apnea and uses a CPAP nightly  . Patient refuses flu shot and colonoscop        Review of Systems   Constitutional: Negative for fatigue and fever. HENT: Negative for congestion, dental problem, sneezing and sore throat. Respiratory: Negative for cough, chest tightness and shortness of breath. Cardiovascular: Negative for chest pain, palpitations and leg swelling. Gastrointestinal: Negative for abdominal pain, constipation, diarrhea and nausea. Endocrine: Negative for cold intolerance and heat intolerance. Genitourinary: Negative for difficulty urinating, dysuria, hematuria and urgency. Musculoskeletal: Negative for arthralgias and back pain. Skin: Negative for color change and pallor. Neurological: Negative for dizziness, seizures, weakness, light-headedness, numbness and headaches. Hematological: Does not bruise/bleed easily. Psychiatric/Behavioral: Negative for confusion and sleep disturbance. The patient is not nervous/anxious.            Current Outpatient Medications:     ALPRAZolam (XANAX) 0.25 MG tablet, TAKE 1 TABLET BY MOUTH 3 TIMES DAILY AS NEEDED FOR ANXIETY FOR UP TO 30 DAYS, Disp: 90 tablet, Rfl: 2    metFORMIN (GLUCOPHAGE) 500 MG tablet, Take 1 tablet by mouth daily (with breakfast), Disp: 30 tablet, Rfl: 5    fluorometholone (FML) 0.1 % ophthalmic suspension, INSTILL ONE DROP INTO EACH EYE THREE TIMES DAILY FOR 14 DAYS, Disp: , Rfl:     NONFORMULARY, CBD oil, Disp: , Rfl:     Multiple Vitamin (MULTI-DAY PO), Take by mouth, Disp: , Rfl:   Medications Discontinued During This Encounter   Medication Reason    ALPRAZolam (XANAX) 0.25 MG tablet REORDER       Allergies   Allergen Reactions    Buspar [Buspirone]     Lexapro  [Escitalopram Oxalate]     Venlafaxine     Zoloft  [Sertraline Hcl]        Past Medical History:   Diagnosis Date    Anxiety     Erectile dysfunction     Hyperlipidemia     Hypogonadism in male     Sleep apnea         There is no immunization history on file for this patient. Vitals:    01/21/21 0706   BP: 110/64   Site: Right Upper Arm   Position: Sitting   Cuff Size: Medium Adult   Pulse: 77   Temp: 97.2 °F (36.2 °C)   TempSrc: Temporal   SpO2: 98%   Weight: 225 lb (102.1 kg)   Height: 5' 10\" (1.778 m)       Physical Exam  Vitals signs reviewed. Constitutional:       General: He is not in acute distress. Appearance: Normal appearance. He is well-developed. He is obese. He is not ill-appearing. HENT:      Head: Normocephalic and atraumatic. Right Ear: External ear normal.      Left Ear: External ear normal.   Eyes:      General: No scleral icterus. Right eye: No discharge. Left eye: No discharge. Pupils: Pupils are equal, round, and reactive to light. Neck:      Musculoskeletal: Normal range of motion and neck supple. Thyroid: Thyromegaly present. Vascular: No JVD. Cardiovascular:      Rate and Rhythm: Normal rate and regular rhythm. Heart sounds: No murmur. Pulmonary:      Effort: Pulmonary effort is normal.      Breath sounds: Normal breath sounds. No wheezing or rales.    Abdominal:      General:

## 2021-01-21 NOTE — ASSESSMENT & PLAN NOTE
Continue watching his diet, consistent carbohydrates intake.   Start Metformin 500 mg with breakfast. Prescription given  Check hemoglobin A1c and urine microalbumin and CMP

## 2021-01-23 DIAGNOSIS — E78.2 MIXED HYPERLIPIDEMIA: Primary | ICD-10-CM

## 2021-01-23 RX ORDER — SIMVASTATIN 20 MG
20 TABLET ORAL NIGHTLY
Qty: 30 TABLET | Refills: 5 | Status: SHIPPED
Start: 2021-01-23 | End: 2021-04-21

## 2021-02-20 PROBLEM — Z00.00 ENCOUNTER FOR ANNUAL PHYSICAL EXAM: Status: RESOLVED | Noted: 2021-01-21 | Resolved: 2021-02-20

## 2021-04-21 ENCOUNTER — OFFICE VISIT (OUTPATIENT)
Dept: PRIMARY CARE CLINIC | Age: 56
End: 2021-04-21
Payer: COMMERCIAL

## 2021-04-21 VITALS
TEMPERATURE: 97.4 F | SYSTOLIC BLOOD PRESSURE: 122 MMHG | OXYGEN SATURATION: 97 % | BODY MASS INDEX: 32.43 KG/M2 | DIASTOLIC BLOOD PRESSURE: 74 MMHG | HEART RATE: 79 BPM | WEIGHT: 226 LBS

## 2021-04-21 DIAGNOSIS — E11.65 TYPE 2 DIABETES MELLITUS WITH HYPERGLYCEMIA, WITHOUT LONG-TERM CURRENT USE OF INSULIN (HCC): Primary | ICD-10-CM

## 2021-04-21 DIAGNOSIS — E78.2 MIXED HYPERLIPIDEMIA: ICD-10-CM

## 2021-04-21 DIAGNOSIS — G47.33 OBSTRUCTIVE SLEEP APNEA SYNDROME: ICD-10-CM

## 2021-04-21 DIAGNOSIS — F41.9 ANXIETY: ICD-10-CM

## 2021-04-21 PROCEDURE — 99213 OFFICE O/P EST LOW 20 MIN: CPT | Performed by: INTERNAL MEDICINE

## 2021-04-21 RX ORDER — ALPRAZOLAM 0.25 MG/1
TABLET ORAL
Qty: 90 TABLET | Refills: 2 | Status: SHIPPED
Start: 2021-04-21 | End: 2021-07-21 | Stop reason: SDUPTHER

## 2021-04-21 RX ORDER — SIMVASTATIN 40 MG
40 TABLET ORAL NIGHTLY
Qty: 30 TABLET | Refills: 5 | Status: SHIPPED
Start: 2021-04-21 | End: 2021-10-12

## 2021-04-21 RX ORDER — SIMVASTATIN 20 MG
20 TABLET ORAL NIGHTLY
Qty: 30 TABLET | Refills: 5 | Status: CANCELLED | OUTPATIENT
Start: 2021-04-21

## 2021-04-21 ASSESSMENT — ENCOUNTER SYMPTOMS
COLOR CHANGE: 0
CONSTIPATION: 0
SHORTNESS OF BREATH: 0
ABDOMINAL PAIN: 0
DIARRHEA: 0
NAUSEA: 0
SORE THROAT: 0
BACK PAIN: 0
CHEST TIGHTNESS: 0
COUGH: 0

## 2021-04-21 ASSESSMENT — PATIENT HEALTH QUESTIONNAIRE - PHQ9
SUM OF ALL RESPONSES TO PHQ QUESTIONS 1-9: 0
SUM OF ALL RESPONSES TO PHQ QUESTIONS 1-9: 0
1. LITTLE INTEREST OR PLEASURE IN DOING THINGS: 0
SUM OF ALL RESPONSES TO PHQ9 QUESTIONS 1 & 2: 0
2. FEELING DOWN, DEPRESSED OR HOPELESS: 0

## 2021-04-21 NOTE — ASSESSMENT & PLAN NOTE
OARRS report reviewed and his use is appropriate. He shows no sign of drug-seeking or drug diversion. He is well aware of the addictive potential of the medication.   Prescription for alprazolam 0.25 mg with 2 refills given and he will see me in 3 months

## 2021-04-21 NOTE — PROGRESS NOTES
with him. We reviewed his controlled substance monitoring. He signed his medication agreement. He is aware of the possible medication side effects tolerance, addiction. He does not want to go on any other medications at this time. No sign of drug seeking or diversion identified. Trini Munguia He did some type of gene sight test for psychotropic medications and he gave me a copy of the report. Alprazolam was 1 of the medications that was acceptable. He did have a decreased folic acid level according to him and so  he started on over-the-counter folate  Daily    He has a history of anxiety mainly on the days that he works. He uses his alprazolam..As needed. OARRS report reviewed. Last fill was on 2/14/2021  Of 90 pills. He does not want to have a colonoscopy. He did FIT tests within the year. He last saw his urologist about a year ago. He had a penile implant about a year ago which works well. He has a history of obstructive sleep apnea and uses a CPAP nightly  . Patient refuses flu shot and colonoscopy        Review of Systems   Constitutional: Negative for fatigue and fever. HENT: Negative for congestion, dental problem, sneezing and sore throat. Respiratory: Negative for cough, chest tightness and shortness of breath. Cardiovascular: Negative for chest pain, palpitations and leg swelling. Gastrointestinal: Negative for abdominal pain, constipation, diarrhea and nausea. Endocrine: Negative for cold intolerance and heat intolerance. Genitourinary: Negative for difficulty urinating, dysuria, hematuria and urgency. Musculoskeletal: Negative for arthralgias and back pain. Skin: Negative for color change and pallor. Neurological: Negative for dizziness, seizures, weakness, light-headedness, numbness and headaches. Hematological: Does not bruise/bleed easily. Psychiatric/Behavioral: Negative for confusion and sleep disturbance. The patient is not nervous/anxious.            Current Outpatient Medications:     ALPRAZolam (XANAX) 0.25 MG tablet, TAKE 1 TABLET BY MOUTH 3 TIMES DAILY AS NEEDED FOR ANXIETY FOR UP TO 30 DAYS, Disp: 90 tablet, Rfl: 2    metFORMIN (GLUCOPHAGE) 500 MG tablet, Take 1 tablet by mouth daily (with breakfast), Disp: 30 tablet, Rfl: 5    simvastatin (ZOCOR) 40 MG tablet, Take 1 tablet by mouth nightly, Disp: 30 tablet, Rfl: 5    NONFORMULARY, CBD oil, Disp: , Rfl:     Multiple Vitamin (MULTI-DAY PO), Take by mouth, Disp: , Rfl:   Medications Discontinued During This Encounter   Medication Reason    fluorometholone (FML) 0.1 % ophthalmic suspension Therapy completed    simvastatin (ZOCOR) 20 MG tablet LIST CLEANUP    ALPRAZolam (XANAX) 0.25 MG tablet REORDER    metFORMIN (GLUCOPHAGE) 500 MG tablet REORDER       Allergies   Allergen Reactions    Buspar [Buspirone]     Lexapro  [Escitalopram Oxalate]     Venlafaxine     Zoloft  [Sertraline Hcl]        Past Medical History:   Diagnosis Date    Anxiety     Erectile dysfunction     Hyperlipidemia     Hypogonadism in male     Sleep apnea       Immunization History   Administered Date(s) Administered    COVID-19, Pfizer, PF, 30mcg/0.3mL 03/28/2021, 04/19/2021        Vitals:    04/21/21 0931   BP: 122/74   Pulse: 79   Temp: 97.4 °F (36.3 °C)   TempSrc: Temporal   SpO2: 97%   Weight: 226 lb (102.5 kg)       Physical Exam  Vitals signs reviewed. Constitutional:       General: He is not in acute distress. Appearance: Normal appearance. He is well-developed. He is obese. He is not ill-appearing. HENT:      Head: Normocephalic and atraumatic. Right Ear: External ear normal.      Left Ear: External ear normal.   Eyes:      General: No scleral icterus. Right eye: No discharge. Left eye: No discharge. Pupils: Pupils are equal, round, and reactive to light. Neck:      Musculoskeletal: Normal range of motion and neck supple. Thyroid: Thyromegaly present. Vascular: No JVD. Cardiovascular:      Rate and Rhythm: Normal rate and regular rhythm. Heart sounds: No murmur. Pulmonary:      Effort: Pulmonary effort is normal.      Breath sounds: Normal breath sounds. No wheezing or rales. Abdominal:      General: Bowel sounds are normal. There is no distension. Palpations: Abdomen is soft. Tenderness: There is no abdominal tenderness. Musculoskeletal: Normal range of motion. General: No deformity. Lymphadenopathy:      Cervical: No cervical adenopathy. Skin:     General: Skin is warm and dry. Coloration: Skin is not pale. Findings: No erythema. Neurological:      General: No focal deficit present. Mental Status: He is alert and oriented to person, place, and time. Motor: No weakness or abnormal muscle tone. Gait: Gait normal.      Deep Tendon Reflexes: Reflexes normal.   Psychiatric:         Mood and Affect: Mood normal.         Behavior: Behavior normal.         Thought Content: Thought content normal.         Judgment: Judgment normal.          Problem List        Respiratory    Sleep apnea       continue nightly use of his CPAP, he does well on it            Endocrine    Type 2 diabetes mellitus with hyperglycemia, without long-term current use of insulin ([address] - Primary       monitor blood sugars as directed. Watch his diet. Continue medications         Relevant Medications    metFORMIN (GLUCOPHAGE) 500 MG tablet       Other    Anxiety       OARRS report reviewed and his use is appropriate. He shows no sign of drug-seeking or drug diversion. He is well aware of the addictive potential of the medication.   Prescription for alprazolam 0.25 mg with 2 refills given and he will see me in 3 months         Relevant Medications    ALPRAZolam (XANAX) 0.25 MG tablet    Hyperlipidemia       continue watching his diet, increase simvastatin to 40 mg at bedtime prescription management and will check his lipids on his next office visit Relevant Medications    simvastatin (ZOCOR) 40 MG tablet        Return in 3 months for medication refill and monitoring.     Seen by:   Leticia Reece DO

## 2021-04-21 NOTE — ASSESSMENT & PLAN NOTE
continue watching his diet, increase simvastatin to 40 mg at bedtime prescription management and will check his lipids on his next office visit

## 2021-07-21 ENCOUNTER — OFFICE VISIT (OUTPATIENT)
Dept: PRIMARY CARE CLINIC | Age: 56
End: 2021-07-21
Payer: COMMERCIAL

## 2021-07-21 VITALS
HEART RATE: 89 BPM | OXYGEN SATURATION: 96 % | TEMPERATURE: 97.8 F | DIASTOLIC BLOOD PRESSURE: 82 MMHG | WEIGHT: 211 LBS | BODY MASS INDEX: 30.28 KG/M2 | SYSTOLIC BLOOD PRESSURE: 128 MMHG

## 2021-07-21 DIAGNOSIS — E11.65 TYPE 2 DIABETES MELLITUS WITH HYPERGLYCEMIA, WITHOUT LONG-TERM CURRENT USE OF INSULIN (HCC): Primary | ICD-10-CM

## 2021-07-21 DIAGNOSIS — E78.2 MIXED HYPERLIPIDEMIA: ICD-10-CM

## 2021-07-21 DIAGNOSIS — F41.9 ANXIETY: ICD-10-CM

## 2021-07-21 DIAGNOSIS — Z12.11 SCREEN FOR COLON CANCER: ICD-10-CM

## 2021-07-21 DIAGNOSIS — Z12.5 SCREENING FOR PROSTATE CANCER: ICD-10-CM

## 2021-07-21 PROCEDURE — 99214 OFFICE O/P EST MOD 30 MIN: CPT | Performed by: INTERNAL MEDICINE

## 2021-07-21 RX ORDER — ALPRAZOLAM 0.25 MG/1
TABLET ORAL
Qty: 90 TABLET | Refills: 2 | Status: SHIPPED
Start: 2021-07-21 | End: 2021-10-12 | Stop reason: SDUPTHER

## 2021-07-21 SDOH — ECONOMIC STABILITY: FOOD INSECURITY: WITHIN THE PAST 12 MONTHS, THE FOOD YOU BOUGHT JUST DIDN'T LAST AND YOU DIDN'T HAVE MONEY TO GET MORE.: NEVER TRUE

## 2021-07-21 SDOH — ECONOMIC STABILITY: FOOD INSECURITY: WITHIN THE PAST 12 MONTHS, YOU WORRIED THAT YOUR FOOD WOULD RUN OUT BEFORE YOU GOT MONEY TO BUY MORE.: NEVER TRUE

## 2021-07-21 ASSESSMENT — ENCOUNTER SYMPTOMS
COUGH: 0
BACK PAIN: 0
DIARRHEA: 0
CONSTIPATION: 0
NAUSEA: 0
CHEST TIGHTNESS: 0
SHORTNESS OF BREATH: 0
ABDOMINAL PAIN: 0
SORE THROAT: 0
COLOR CHANGE: 0

## 2021-07-21 ASSESSMENT — SOCIAL DETERMINANTS OF HEALTH (SDOH): HOW HARD IS IT FOR YOU TO PAY FOR THE VERY BASICS LIKE FOOD, HOUSING, MEDICAL CARE, AND HEATING?: NOT HARD AT ALL

## 2021-07-21 NOTE — ASSESSMENT & PLAN NOTE
Check fasting blood sugar and hemoglobin A1c. If hemoglobin A1c is below 5.6 then we can probably discontinue his Metformin as he has lost quite a bit of weight. He will continue on his diet and will recheck hemoglobin A1c on his next office visit.

## 2021-07-21 NOTE — ASSESSMENT & PLAN NOTE
well-controlled on alprazolam.  OARRS report reviewed and his use is appropriate. He shows no sign of drug-seeking or diversion.   Prescription given with 2 refills which will take him up to his 3-month appointment Negative

## 2021-07-21 NOTE — PROGRESS NOTES
19    Name: Vita Garcia     :1965      Sex:male       Age : 48 y.o. Chief Complaint   Patient presents with    Hyperlipidemia    Diabetes        This 47y.o. -year-old male  presents today for evaluation and management of his  chronic medical problems. Current medication list reviewed. The patient is tolerating all medications well without adverse events or known side effects. The patient does understand the risk and benefits of the prescribed medications. Diabetes  Pertinent negatives for hypoglycemia include no confusion, dizziness, headaches, nervousness/anxiousness, pallor or seizures. Pertinent negatives for diabetes include no chest pain, no fatigue and no weakness. Hyperlipidemia  Pertinent negatives include no chest pain or shortness of breath. .    He was diagnosed with type 2 diabetes mellitus after his last visit. His hemoglobin A1c In 2020 was 7.1% his fasting blood sugar was 138. Fiona Hutchinson He opted to try diet and exercise first and is been watching his diet. He has lost 14 pounds since his last office visit. In 2021 his hemoglobin A1c was down to 6.8%. He was started on Metformin 500 mg with breakfast.  We will recheck his hemoglobin A1c today. He has lost another about 15 pounds in the last 3 months. If his hemoglobin A1c is excellent we may be able to take him off his Metformin and just have him continue with his diet and monitor his blood sugars to make sure that they stay in range. His total cholesterol was 251 with an LDL cholesterol of 173. Triglycerides 169 and HDL 44. He was on 20 mg of simvastatin daily. I am interested in seeing how this has improved since he has lost weight and started to exercise. Fasting blood sugar was 116. Kidney and liver blood work was normal.      He had his diabetic eye examination at [facility] in [place]. We will get reports. He refuses any immunizations including Pneumovax and flu shots.  He got COVID-19 vaccine series    Patient has a history of anxiety. He takes alprazolam on a regular basis 0.25 mg 3 times daily. We discussed the use of medications such as Cymbalta or Effexor which he says he can take based on his gene study. Other ones that he is not supposed to take include all the regular ones such as Wellbutrin Zoloft Paxil Prozac Lexapro and Celexa. He signed his medication agreement. This was reviewed with him. We reviewed his controlled substance monitoring. He signed his medication agreement. He is aware of the possible medication side effects tolerance, addiction. He does not want to go on any other medications at this time. No sign of drug seeking or diversion identified. Elgin Riley He did some type of gene sight test for psychotropic medications and he gave me a copy of the report. Alprazolam was 1 of the medications that was acceptable. He did have a decreased folic acid level according to him and so  he started on over-the-counter folate  Daily    He has a history of anxiety mainly on the days that he works. He uses his alprazolam..As needed. OARRS report reviewed. Last fill was on 6/11/2021 Of 90 pills. He does not want to have a colonoscopy. He will do a fit test.  He last saw his urologist about a year ago. He had a penile implant about a year ago which works well. He has a history of obstructive sleep apnea and uses a CPAP nightly. Over the last few months he started sneezing at about 3 AM in the morning. Thinks is from his CPAP machine  . Patient refuses flu shot and colonoscopy        Review of Systems   Constitutional: Negative for fatigue and fever. HENT: Positive for sneezing. Negative for congestion, dental problem and sore throat. Respiratory: Negative for cough, chest tightness and shortness of breath. Cardiovascular: Negative for chest pain, palpitations and leg swelling.    Gastrointestinal: Negative for abdominal pain, constipation, diarrhea and nausea. Endocrine: Negative for cold intolerance and heat intolerance. Genitourinary: Negative for difficulty urinating, dysuria, hematuria and urgency. Musculoskeletal: Negative for arthralgias and back pain. Skin: Negative for color change and pallor. Neurological: Negative for dizziness, seizures, weakness, light-headedness, numbness and headaches. Hematological: Does not bruise/bleed easily. Psychiatric/Behavioral: Negative for confusion and sleep disturbance. The patient is not nervous/anxious. Current Outpatient Medications:     ALPRAZolam (XANAX) 0.25 MG tablet, TAKE 1 TABLET BY MOUTH 3 TIMES DAILY AS NEEDED FOR ANXIETY FOR UP TO 30 DAYS, Disp: 90 tablet, Rfl: 2    metFORMIN (GLUCOPHAGE) 500 MG tablet, Take 1 tablet by mouth daily (with breakfast), Disp: 30 tablet, Rfl: 5    simvastatin (ZOCOR) 40 MG tablet, Take 1 tablet by mouth nightly, Disp: 30 tablet, Rfl: 5    NONFORMULARY, CBD oil, Disp: , Rfl:     Multiple Vitamin (MULTI-DAY PO), Take by mouth, Disp: , Rfl:   Medications Discontinued During This Encounter   Medication Reason    ALPRAZolam (XANAX) 0.25 MG tablet REORDER       Allergies   Allergen Reactions    Buspar [Buspirone]     Lexapro  [Escitalopram Oxalate]     Venlafaxine     Zoloft  [Sertraline Hcl]        Past Medical History:   Diagnosis Date    Anxiety     Erectile dysfunction     Hyperlipidemia     Hypogonadism in male     Sleep apnea       Immunization History   Administered Date(s) Administered    COVID-19, Pfizer, PF, 30mcg/0.3mL 03/28/2021, 04/19/2021        Vitals:    07/21/21 0725   BP: 128/82   Pulse: 89   Temp: 97.8 °F (36.6 °C)   TempSrc: Temporal   SpO2: 96%   Weight: 211 lb (95.7 kg)       Physical Exam  Vitals reviewed. Constitutional:       General: He is not in acute distress. Appearance: Normal appearance. He is well-developed. He is obese. He is not ill-appearing. HENT:      Head: Normocephalic and atraumatic.       Right Ear: External ear normal.      Left Ear: External ear normal.   Eyes:      General: No scleral icterus. Right eye: No discharge. Left eye: No discharge. Pupils: Pupils are equal, round, and reactive to light. Neck:      Thyroid: Thyromegaly present. Vascular: No JVD. Cardiovascular:      Rate and Rhythm: Normal rate and regular rhythm. Heart sounds: No murmur heard. Pulmonary:      Effort: Pulmonary effort is normal.      Breath sounds: Normal breath sounds. No wheezing or rales. Abdominal:      General: Bowel sounds are normal. There is no distension. Palpations: Abdomen is soft. Tenderness: There is no abdominal tenderness. Musculoskeletal:         General: No deformity. Normal range of motion. Cervical back: Normal range of motion and neck supple. Lymphadenopathy:      Cervical: No cervical adenopathy. Skin:     General: Skin is warm and dry. Coloration: Skin is not pale. Findings: No erythema. Neurological:      General: No focal deficit present. Mental Status: He is alert and oriented to person, place, and time. Motor: No weakness or abnormal muscle tone. Gait: Gait normal.      Deep Tendon Reflexes: Reflexes normal.   Psychiatric:         Mood and Affect: Mood normal.         Behavior: Behavior normal.         Thought Content: Thought content normal.         Judgment: Judgment normal.          Problem List        Endocrine    Type 2 diabetes mellitus with hyperglycemia, without long-term current use of insulin ([facility]) - Primary     Check fasting blood sugar and hemoglobin A1c. If hemoglobin A1c is below 5.6 then we can probably discontinue his Metformin as he has lost quite a bit of weight. He will continue on his diet and will recheck hemoglobin A1c on his next office visit.           Relevant Medications    metFORMIN (GLUCOPHAGE) 500 MG tablet    Other Relevant Orders    Comprehensive Metabolic Panel    Hemoglobin A1C Microalbumin / Creatinine Urine Ratio       Other    Anxiety       well-controlled on alprazolam.  OARRS report reviewed and his use is appropriate. He shows no sign of drug-seeking or diversion. Prescription given with 2 refills which will take him up to his 3-month appointment         Relevant Medications    ALPRAZolam (XANAX) 0.25 MG tablet    Hyperlipidemia     Watch saturated fats in diet and will monitor lipids  continue simvastatin 40 mg daily         Relevant Medications    simvastatin (ZOCOR) 40 MG tablet    Other Relevant Orders    Comprehensive Metabolic Panel    Lipid Panel    Screening for prostate cancer       screening PSA with copy to his urologist         Relevant Orders    PSA screening    Screen for colon cancer       will do his yearly fit test as patient refuses to have a screening colonoscopy         Relevant Orders    POCT Fecal Immunochemical Test (FIT)        Return in 3 months for medication refill and monitoring.     Seen by:   Mirta Still, DO

## 2021-08-20 PROBLEM — Z12.11 SCREEN FOR COLON CANCER: Status: RESOLVED | Noted: 2021-07-21 | Resolved: 2021-08-20

## 2021-08-20 PROBLEM — Z12.5 SCREENING FOR PROSTATE CANCER: Status: RESOLVED | Noted: 2019-10-09 | Resolved: 2021-08-20

## 2021-10-12 DIAGNOSIS — E78.2 MIXED HYPERLIPIDEMIA: ICD-10-CM

## 2021-10-12 DIAGNOSIS — F41.9 ANXIETY: ICD-10-CM

## 2021-10-12 RX ORDER — SIMVASTATIN 40 MG
TABLET ORAL
Qty: 90 TABLET | Refills: 1 | Status: SHIPPED
Start: 2021-10-12 | End: 2022-01-26 | Stop reason: SDUPTHER

## 2021-10-12 RX ORDER — ALPRAZOLAM 0.25 MG/1
TABLET ORAL
Qty: 90 TABLET | Refills: 2 | Status: SHIPPED
Start: 2021-10-12 | End: 2022-01-26 | Stop reason: SDUPTHER

## 2021-10-12 NOTE — TELEPHONE ENCOUNTER
Last Appointment:  7/21/2021  Future Appointments   Date Time Provider Harrison Gabmle   10/21/2021  7:00 AM [address] [facility] [address]

## 2021-10-27 ENCOUNTER — OFFICE VISIT (OUTPATIENT)
Dept: PRIMARY CARE CLINIC | Age: 56
End: 2021-10-27
Payer: COMMERCIAL

## 2021-10-27 VITALS
WEIGHT: 219 LBS | SYSTOLIC BLOOD PRESSURE: 130 MMHG | TEMPERATURE: 97.8 F | HEIGHT: 70 IN | HEART RATE: 98 BPM | OXYGEN SATURATION: 97 % | BODY MASS INDEX: 31.35 KG/M2 | DIASTOLIC BLOOD PRESSURE: 82 MMHG

## 2021-10-27 DIAGNOSIS — E78.2 MIXED HYPERLIPIDEMIA: ICD-10-CM

## 2021-10-27 DIAGNOSIS — G47.33 OBSTRUCTIVE SLEEP APNEA SYNDROME: ICD-10-CM

## 2021-10-27 DIAGNOSIS — Z12.5 SCREENING FOR PROSTATE CANCER: ICD-10-CM

## 2021-10-27 DIAGNOSIS — E11.65 TYPE 2 DIABETES MELLITUS WITH HYPERGLYCEMIA, WITHOUT LONG-TERM CURRENT USE OF INSULIN (HCC): Primary | ICD-10-CM

## 2021-10-27 DIAGNOSIS — E11.65 TYPE 2 DIABETES MELLITUS WITH HYPERGLYCEMIA, WITHOUT LONG-TERM CURRENT USE OF INSULIN (HCC): ICD-10-CM

## 2021-10-27 DIAGNOSIS — Z23 NEED FOR VACCINATION WITH PVAX (PNEUMOCOCCAL VACCINATION): ICD-10-CM

## 2021-10-27 DIAGNOSIS — F41.9 ANXIETY: ICD-10-CM

## 2021-10-27 LAB
ALBUMIN SERPL-MCNC: 4.4 G/DL (ref 3.5–5.2)
ALP BLD-CCNC: 70 U/L (ref 40–129)
ALT SERPL-CCNC: 13 U/L (ref 0–40)
ANION GAP SERPL CALCULATED.3IONS-SCNC: 12 MMOL/L (ref 7–16)
AST SERPL-CCNC: 16 U/L (ref 0–39)
BILIRUB SERPL-MCNC: 0.5 MG/DL (ref 0–1.2)
BUN BLDV-MCNC: 14 MG/DL (ref 6–20)
CALCIUM SERPL-MCNC: 9.3 MG/DL (ref 8.6–10.2)
CHLORIDE BLD-SCNC: 102 MMOL/L (ref 98–107)
CHOLESTEROL, TOTAL: 191 MG/DL (ref 0–199)
CO2: 26 MMOL/L (ref 22–29)
CREAT SERPL-MCNC: 1.1 MG/DL (ref 0.7–1.2)
CREATININE URINE: 117 MG/DL (ref 40–278)
GFR AFRICAN AMERICAN: >60
GFR NON-AFRICAN AMERICAN: >60 ML/MIN/1.73
GLUCOSE BLD-MCNC: 107 MG/DL (ref 74–99)
HBA1C MFR BLD: 6.3 % (ref 4–5.6)
HDLC SERPL-MCNC: 49 MG/DL
LDL CHOLESTEROL CALCULATED: 116 MG/DL (ref 0–99)
MICROALBUMIN UR-MCNC: 12 MG/L
MICROALBUMIN/CREAT UR-RTO: 10.3 (ref 0–30)
POTASSIUM SERPL-SCNC: 4.5 MMOL/L (ref 3.5–5)
PROSTATE SPECIFIC ANTIGEN: 0.42 NG/ML (ref 0–4)
SODIUM BLD-SCNC: 140 MMOL/L (ref 132–146)
TOTAL PROTEIN: 7.3 G/DL (ref 6.4–8.3)
TRIGL SERPL-MCNC: 130 MG/DL (ref 0–149)
VLDLC SERPL CALC-MCNC: 26 MG/DL

## 2021-10-27 PROCEDURE — 99214 OFFICE O/P EST MOD 30 MIN: CPT | Performed by: INTERNAL MEDICINE

## 2021-10-27 PROCEDURE — 90732 PPSV23 VACC 2 YRS+ SUBQ/IM: CPT | Performed by: INTERNAL MEDICINE

## 2021-10-27 PROCEDURE — 90471 IMMUNIZATION ADMIN: CPT | Performed by: INTERNAL MEDICINE

## 2021-10-27 ASSESSMENT — ENCOUNTER SYMPTOMS
NAUSEA: 0
DIARRHEA: 0
COLOR CHANGE: 0
BACK PAIN: 0
SORE THROAT: 0
SHORTNESS OF BREATH: 0
CONSTIPATION: 0
ABDOMINAL PAIN: 0
CHEST TIGHTNESS: 0
COUGH: 0

## 2021-10-27 NOTE — PROGRESS NOTES
19    Name: Arnoldo Garcia     :1965      Sex:male       Age : 55 y.o. Chief Complaint   Patient presents with    Diabetes     3 month visit        This 47y.o. -year-old male  presents today for evaluation and management of his  chronic medical problems. Current medication list reviewed. The patient is tolerating all medications well without adverse events or known side effects. The patient does understand the risk and benefits of the prescribed medications. Hyperlipidemia  Pertinent negatives include no chest pain or shortness of breath. Diabetes  Pertinent negatives for hypoglycemia include no confusion, dizziness, headaches, nervousness/anxiousness, pallor or seizures. Pertinent negatives for diabetes include no chest pain, no fatigue and no weakness. Janelle Armenta He was diagnosed with type 2 diabetes mellitus after his last visit. His hemoglobin A1c In 2020 was 7.1% his fasting blood sugar was 138. Janelle Armenta He opted to try diet and exercise first and is been watching his diet. He has lost 14 pounds since his last office visit. In 2021 his hemoglobin A1c was down to 6.8%. He was started on Metformin 500 mg with breakfast.  We will recheck his hemoglobin A1c today. He has lost another about 15 pounds in the last 3 months. If his hemoglobin A1c is excellent we may be able to take him off his Metformin and just have him continue with his diet and monitor his blood sugars to make sure that they stay in range. His total cholesterol was 251 with an LDL cholesterol of 173. Triglycerides 169 and HDL 44. He was on 20 mg of simvastatin daily. His simvastatin was increased to 40 mg a day  Fasting blood sugar was 116. Kidney and liver blood work was normal.      He had his diabetic eye examination at [facility] in [place]. Last year we will get reports. He refuses  immunizations  flu shots. He got COVID-19 vaccine series. He will get his Pneumovax today.     Patient has a history of anxiety. He takes alprazolam on a regular basis 0.25 mg 3 times daily. We discussed the use of medications such as Cymbalta or Effexor which he says he can take based on his gene study. Other ones that he is not supposed to take include all the regular ones such as Wellbutrin Zoloft Paxil Prozac Lexapro and Celexa. He signed his medication agreement. This was reviewed with him. We reviewed his controlled substance monitoring. He signed his medication agreement. He is aware of the possible medication side effects tolerance, addiction. He does not want to go on any other medications at this time. No sign of drug seeking or diversion identified. Nneka Randle He did some type of gene sight test for psychotropic medications and he gave me a copy of the report. Alprazolam was 1 of the medications that was acceptable. He did have a decreased folic acid level according to him and so  he started on over-the-counter folate  Daily    He has a history of anxiety mainly on the days that he works. He uses his alprazolam..As needed. OARRS report reviewed. Last fill was on 10/12 /2021 Of 90 pills. He does not need a new prescription today. He does not want to have a colonoscopy. He will do a fit test.  He last saw his urologist about a year ago. He had a penile implant about a year ago which works well. He has a history of obstructive sleep apnea and uses a CPAP nightly. Over the last few months he started sneezing at about 3 AM in the morning. Thinks is from his CPAP machine. He may need new equipment for his CPAP as its been over 12 years since his last study we discussed getting a new sleep study and he will think about it. .        Review of Systems   Constitutional: Negative for fatigue and fever. HENT: Positive for sneezing. Negative for congestion, dental problem and sore throat. Respiratory: Negative for cough, chest tightness and shortness of breath.     Cardiovascular: Negative for chest pain, palpitations and leg swelling. Gastrointestinal: Negative for abdominal pain, constipation, diarrhea and nausea. Endocrine: Negative for cold intolerance and heat intolerance. Genitourinary: Negative for difficulty urinating, dysuria, hematuria and urgency. Musculoskeletal: Negative for arthralgias and back pain. Skin: Negative for color change and pallor. Neurological: Negative for dizziness, seizures, weakness, light-headedness, numbness and headaches. Hematological: Does not bruise/bleed easily. Psychiatric/Behavioral: Negative for confusion and sleep disturbance. The patient is not nervous/anxious. Current Outpatient Medications:     metFORMIN (GLUCOPHAGE) 500 MG tablet, Take 1 tablet by mouth daily (with breakfast), Disp: 30 tablet, Rfl: 5    simvastatin (ZOCOR) 40 MG tablet, TAKE 1 TABLET BY MOUTH EVERY DAY AT NIGHT, Disp: 90 tablet, Rfl: 1    ALPRAZolam (XANAX) 0.25 MG tablet, TAKE 1 TABLET BY MOUTH 3 TIMES DAILY AS NEEDED FOR ANXIETY FOR UP TO 30 DAYS, Disp: 90 tablet, Rfl: 2    NONFORMULARY, CBD oil, Disp: , Rfl:     Multiple Vitamin (MULTI-DAY PO), Take by mouth, Disp: , Rfl:   Medications Discontinued During This Encounter   Medication Reason    metFORMIN (GLUCOPHAGE) 500 MG tablet REORDER       Allergies   Allergen Reactions    Buspar [Buspirone]     Lexapro  [Escitalopram Oxalate]     Venlafaxine     Zoloft  [Sertraline Hcl]        Past Medical History:   Diagnosis Date    Anxiety     Erectile dysfunction     Hyperlipidemia     Hypogonadism in male     Sleep apnea       Immunization History   Administered Date(s) Administered    COVID-19, Pfizer, PF, 30mcg/0.3mL 03/28/2021, 04/19/2021    Pneumococcal Polysaccharide ([id number]) 10/27/2021        Vitals:    10/27/21 1030   BP: 130/82   Pulse: 98   Temp: 97.8 °F (36.6 °C)   SpO2: 97%   Weight: 219 lb (99.3 kg)   Height: 5' 10\" (1.778 m)       Physical Exam  Vitals reviewed.    Constitutional: General: He is not in acute distress. Appearance: Normal appearance. He is well-developed. He is obese. He is not ill-appearing. HENT:      Head: Normocephalic and atraumatic. Right Ear: External ear normal.      Left Ear: External ear normal.   Eyes:      General: No scleral icterus. Right eye: No discharge. Left eye: No discharge. Pupils: Pupils are equal, round, and reactive to light. Neck:      Thyroid: Thyromegaly present. Vascular: No JVD. Cardiovascular:      Rate and Rhythm: Normal rate and regular rhythm. Heart sounds: No murmur heard. Pulmonary:      Effort: Pulmonary effort is normal.      Breath sounds: Normal breath sounds. No wheezing or rales. Abdominal:      General: Bowel sounds are normal. There is no distension. Palpations: Abdomen is soft. Tenderness: There is no abdominal tenderness. Musculoskeletal:         General: No deformity. Normal range of motion. Cervical back: Normal range of motion and neck supple. Lymphadenopathy:      Cervical: No cervical adenopathy. Skin:     General: Skin is warm and dry. Coloration: Skin is not pale. Findings: No erythema. Neurological:      General: No focal deficit present. Mental Status: He is alert and oriented to person, place, and time. Motor: No weakness or abnormal muscle tone. Gait: Gait normal.      Deep Tendon Reflexes: Reflexes normal.   Psychiatric:         Mood and Affect: Mood normal.         Behavior: Behavior normal.         Thought Content: Thought content normal.         Judgment: Judgment normal.          Problem List        Respiratory    Sleep apnea       may need requisition for CPAP titration study. He has not had one in 12 years.   He will also need equipment for his CPAP            Endocrine    Type 2 diabetes mellitus with hyperglycemia, without long-term current use of insulin ([facility]) - Primary       check hemoglobin A1c and fasting blood sugars. If they are within range of his hemoglobin A1c is below 6.5 we can discontinue his Metformin and just keep him on diet and exercise         Relevant Medications    metFORMIN (GLUCOPHAGE) 500 MG tablet    Other Relevant Orders    Comprehensive Metabolic Panel    Hemoglobin A1C    Microalbumin / Creatinine Urine Ratio    DME Order for Diabetic Testing Supplies as OP       Other    Anxiety       continue alprazolam as directed and call when he needs a refill. He usually gets enough for 30 days with 2 refills to equal  3 months worth         Relevant Medications    ALPRAZolam (XANAX) 0.25 MG tablet    Hyperlipidemia       continue simvastatin 40 mg daily and check lipids         Relevant Medications    simvastatin (ZOCOR) 40 MG tablet    Other Relevant Orders    Comprehensive Metabolic Panel    Lipid Panel    Screening for prostate cancer       screening PSA drawn today         Relevant Orders    PSA screening    Need for vaccination with PVAX (pneumococcal vaccination)       Pneumovax given         Relevant Orders    Pneumococcal polysaccharide vaccine 23-valent greater than or equal to 1yo subcutaneous/IM (Completed)        Return in 3 months for medication refill and monitoring.     Seen by:   Vivek Lentz,

## 2021-10-27 NOTE — ASSESSMENT & PLAN NOTE
may need requisition for CPAP titration study. He has not had one in 12 years.   He will also need equipment for his CPAP

## 2021-10-27 NOTE — ASSESSMENT & PLAN NOTE
continue alprazolam as directed and call when he needs a refill.   He usually gets enough for 30 days with 2 refills to equal  3 months worth

## 2021-10-27 NOTE — ASSESSMENT & PLAN NOTE
check hemoglobin A1c and fasting blood sugars.   If they are within range of his hemoglobin A1c is below 6.5 we can discontinue his Metformin and just keep him on diet and exercise

## 2021-11-08 DIAGNOSIS — Z12.11 SCREEN FOR COLON CANCER: ICD-10-CM

## 2021-11-08 LAB
CONTROL: NORMAL
HEMOCCULT STL QL: POSITIVE

## 2021-11-26 PROBLEM — Z12.5 SCREENING FOR PROSTATE CANCER: Status: RESOLVED | Noted: 2019-10-09 | Resolved: 2021-11-26

## 2022-01-26 ENCOUNTER — OFFICE VISIT (OUTPATIENT)
Dept: PRIMARY CARE CLINIC | Age: 57
End: 2022-01-26
Payer: COMMERCIAL

## 2022-01-26 VITALS
TEMPERATURE: 97.8 F | WEIGHT: 222 LBS | HEART RATE: 99 BPM | DIASTOLIC BLOOD PRESSURE: 82 MMHG | BODY MASS INDEX: 31.78 KG/M2 | SYSTOLIC BLOOD PRESSURE: 136 MMHG | HEIGHT: 70 IN | OXYGEN SATURATION: 98 %

## 2022-01-26 DIAGNOSIS — R07.2 PRECORDIAL PAIN: Primary | ICD-10-CM

## 2022-01-26 DIAGNOSIS — F41.9 ANXIETY: ICD-10-CM

## 2022-01-26 DIAGNOSIS — E78.2 MIXED HYPERLIPIDEMIA: ICD-10-CM

## 2022-01-26 DIAGNOSIS — E11.65 TYPE 2 DIABETES MELLITUS WITH HYPERGLYCEMIA, WITHOUT LONG-TERM CURRENT USE OF INSULIN (HCC): ICD-10-CM

## 2022-01-26 PROBLEM — Z23 NEED FOR VACCINATION WITH PVAX (PNEUMOCOCCAL VACCINATION): Status: RESOLVED | Noted: 2021-10-27 | Resolved: 2022-01-26

## 2022-01-26 PROCEDURE — 99214 OFFICE O/P EST MOD 30 MIN: CPT | Performed by: INTERNAL MEDICINE

## 2022-01-26 PROCEDURE — 93000 ELECTROCARDIOGRAM COMPLETE: CPT | Performed by: INTERNAL MEDICINE

## 2022-01-26 RX ORDER — SIMVASTATIN 40 MG
TABLET ORAL
Qty: 90 TABLET | Refills: 1 | Status: SHIPPED
Start: 2022-01-26 | End: 2022-07-25 | Stop reason: SDUPTHER

## 2022-01-26 RX ORDER — ALPRAZOLAM 0.25 MG/1
TABLET ORAL
Qty: 90 TABLET | Refills: 2 | Status: SHIPPED
Start: 2022-01-26 | End: 2022-04-25 | Stop reason: SDUPTHER

## 2022-01-26 ASSESSMENT — ENCOUNTER SYMPTOMS
SORE THROAT: 0
COLOR CHANGE: 0
BACK PAIN: 0
ABDOMINAL PAIN: 0
CHEST TIGHTNESS: 0
NAUSEA: 0
SHORTNESS OF BREATH: 0
CONSTIPATION: 0
DIARRHEA: 0
COUGH: 0

## 2022-01-26 NOTE — PROGRESS NOTES
19    Name: Corbin Camacho     :1965      Sex:male       Age : 55 y.o. Chief Complaint   Patient presents with    Diabetes     3 month visit and med refills        About 2 weeks ago he had an episode of chest pain. He describes this as sharp and stabbing. It was anterior and did not radiate to his shoulder or neck. He was not short of breath ,diaphoretic or dizzy with this episode. He has no family history of premature heart disease. Not precipitated by exertion, went away on his own. Yoshi Pelayo His daughter who lives with him was recently diagnosed with Covid. She was fully vaccinated as is the patient. Patient has no symptoms except occasionally sneezes. She is quarantining. He does not need to get a COVID-19 test as he is asymptomatic and fully vaccinated    He was diagnosed with type 2 diabetes mellitus . His hemoglobin A1c In 2020 was 7.1% his fasting blood sugar was 138. Yoshi Pelayo He opted to try diet and exercise first and is been watching his diet. He has lost 14 pounds since his last office visit. In 2021 his hemoglobin A1c was down to 6.8%. He was started on Metformin 500 mg with breakfast.  His hemoglobin A1c in 2021 was 6.3%. He has lost another about 15 pounds in the last 3 months. On 40 mg of simvastatin daily his total cholesterol dropped to 191. His triglycerides dropped to 130, LDL cholesterol is 116 and HDL cholesterol is 49. Fasting blood sugar was 107. Kidney and liver blood work was normal.  No excess protein in his urine. PSA was 0.42. Fit test was negative for occult blood      He had his diabetic eye examination at [facility] in [place]. Last year we will get reports. He refuses other immunizations or flu shots. He got COVID-19 vaccine series. He had his COVID-19 booster. He had his Pneumovax     Patient has a history of anxiety. He takes alprazolam on a regular basis 0.25 mg 3 times daily.   We discussed the use of medications such as Cymbalta or Effexor which he says he can take based on his gene study. Other ones that he is not supposed to take include all the regular ones such as Wellbutrin Zoloft Paxil Prozac Lexapro and Celexa. He signed his medication agreement. This was reviewed with him. We reviewed his controlled substance monitoring. He signed his medication agreement. He is aware of the possible medication side effects tolerance, addiction. He does not want to go on any other medications at this time. No sign of drug seeking or diversion identified. OARRS report reviewed and he last filled his alprazolam 0.25 mg on January 14, 2022. He did some type of gene sight test for psychotropic medications and he gave me a copy of the report. Alprazolam was 1 of the medications that was acceptable. He did have a decreased folic acid level according to him and so  he started on over-the-counter folate  Daily        He does not want to have a colonoscopy. He will do a fit test.  He last saw his urologist about a year ago. He had a penile implant about a year ago which works well. He has a history of obstructive sleep apnea and uses a CPAP nightly. Over the last few months he started sneezing at about 3 AM in the morning. Thinks is from his CPAP machine. He may need new equipment for his CPAP as its been over 12 years since his last study we discussed getting a new sleep study and he will think about it. .        Review of Systems   Constitutional: Negative for fatigue and fever. HENT: Positive for sneezing. Negative for congestion, dental problem and sore throat. Respiratory: Negative for cough, chest tightness and shortness of breath. Cardiovascular: Positive for chest pain. Negative for palpitations and leg swelling. See HPI   Gastrointestinal: Negative for abdominal pain, constipation, diarrhea and nausea. Endocrine: Negative for cold intolerance and heat intolerance.    Genitourinary: Negative for difficulty urinating, dysuria, hematuria and urgency. Musculoskeletal: Negative for arthralgias and back pain. Skin: Negative for color change and pallor. Neurological: Negative for dizziness, seizures, weakness, light-headedness, numbness and headaches. Hematological: Does not bruise/bleed easily. Psychiatric/Behavioral: Negative for confusion and sleep disturbance. The patient is not nervous/anxious. Current Outpatient Medications:     simvastatin (ZOCOR) 40 MG tablet, TAKE 1 TABLET BY MOUTH EVERY DAY AT NIGHT, Disp: 90 tablet, Rfl: 1    ALPRAZolam (XANAX) 0.25 MG tablet, TAKE 1 TABLET BY MOUTH 3 TIMES DAILY AS NEEDED FOR ANXIETY FOR UP TO 30 DAYS, Disp: 90 tablet, Rfl: 2    metFORMIN (GLUCOPHAGE) 500 MG tablet, Take 1 tablet by mouth daily (with breakfast), Disp: 30 tablet, Rfl: 5    NONFORMULARY, CBD oil, Disp: , Rfl:     Multiple Vitamin (MULTI-DAY PO), Take by mouth, Disp: , Rfl:   Medications Discontinued During This Encounter   Medication Reason    simvastatin (ZOCOR) 40 MG tablet REORDER    ALPRAZolam (XANAX) 0.25 MG tablet REORDER    metFORMIN (GLUCOPHAGE) 500 MG tablet REORDER       Allergies   Allergen Reactions    Buspar [Buspirone]     Lexapro  [Escitalopram Oxalate]     Venlafaxine     Zoloft  [Sertraline Hcl]        Past Medical History:   Diagnosis Date    Anxiety     Erectile dysfunction     Hyperlipidemia     Hypogonadism in male     Sleep apnea       Immunization History   Administered Date(s) Administered    COVID-19, Pfizer Purple top, DILUTE for use, 12+ yrs, 30mcg/0.3mL dose 03/28/2021, 04/19/2021, 12/07/2021    Pneumococcal Polysaccharide ([id number]) 10/27/2021        Vitals:    01/26/22 0951   BP: 136/82   Pulse: 99   Temp: 97.8 °F (36.6 °C)   SpO2: 98%   Weight: 222 lb (100.7 kg)   Height: 5' 10\" (1.778 m)       Physical Exam  Vitals reviewed. Constitutional:       General: He is not in acute distress.      Appearance: Normal appearance. He is well-developed. He is obese. He is not ill-appearing. HENT:      Head: Normocephalic and atraumatic. Right Ear: External ear normal.      Left Ear: External ear normal.   Eyes:      General: No scleral icterus. Right eye: No discharge. Left eye: No discharge. Pupils: Pupils are equal, round, and reactive to light. Neck:      Thyroid: Thyromegaly present. Vascular: No JVD. Cardiovascular:      Rate and Rhythm: Normal rate and regular rhythm. Heart sounds: No murmur heard. Pulmonary:      Effort: Pulmonary effort is normal.      Breath sounds: Normal breath sounds. No wheezing or rales. Abdominal:      General: Bowel sounds are normal. There is no distension. Palpations: Abdomen is soft. Tenderness: There is no abdominal tenderness. Musculoskeletal:         General: No deformity. Normal range of motion. Cervical back: Normal range of motion and neck supple. Lymphadenopathy:      Cervical: No cervical adenopathy. Skin:     General: Skin is warm and dry. Coloration: Skin is not pale. Findings: No erythema. Neurological:      General: No focal deficit present. Mental Status: He is alert and oriented to person, place, and time. Motor: No weakness or abnormal muscle tone. Gait: Gait normal.      Deep Tendon Reflexes: Reflexes normal.   Psychiatric:         Mood and Affect: Mood normal.         Behavior: Behavior normal.         Thought Content: Thought content normal.         Judgment: Judgment normal.          Problem List        Endocrine    Type 2 diabetes mellitus with hyperglycemia, without long-term current use of insulin ([facility])       hemoglobin A1c is good. He will stay on his Metformin and watch his diet. Relevant Medications    metFORMIN (GLUCOPHAGE) 500 MG tablet       Other    Anxiety       OARRS report reviewed and his use of alprazolam is appropriate.   Prescription management Relevant Medications    ALPRAZolam (XANAX) 0.25 MG tablet    Hyperlipidemia     Watch saturated fats in diet and will monitor lipids  continue simvastatin 40 once daily         Relevant Medications    simvastatin (ZOCOR) 40 MG tablet    Precordial pain - Primary       does not sound cardiac in etiology however he does have risk factors with his diabetes, male sex, age and hyperlipidemia. EKG was done which showed very nonspecific T wave changes, no acute changes. We will get an exercise stress test either at [facility] or at one of the cardiologist office such as [place] [facility]. Set that up for patient. We will let him know the results and what else needs to be done about it. If there is no evidence of ischemia on the stress test I will see him in 3 months         Relevant Orders    EKG 12 Lead (Completed)    CARDIAC STRESS TEST EXERCISE ONLY        Return in 3 months for medication refill and monitoring.     Seen by:   Rosie Araujo DO

## 2022-01-26 NOTE — ASSESSMENT & PLAN NOTE
does not sound cardiac in etiology however he does have risk factors with his diabetes, male sex, age and hyperlipidemia. EKG was done which showed very nonspecific T wave changes, no acute changes. We will get an exercise stress test either at [facility] or at one of the cardiologist office such as [facility]. Set that up for patient. We will let him know the results and what else needs to be done about it.   If there is no evidence of ischemia on the stress test I will see him in 3 months

## 2022-04-25 ENCOUNTER — OFFICE VISIT (OUTPATIENT)
Dept: PRIMARY CARE CLINIC | Age: 57
End: 2022-04-25
Payer: COMMERCIAL

## 2022-04-25 VITALS
WEIGHT: 222 LBS | SYSTOLIC BLOOD PRESSURE: 118 MMHG | BODY MASS INDEX: 31.85 KG/M2 | HEART RATE: 77 BPM | TEMPERATURE: 97.2 F | DIASTOLIC BLOOD PRESSURE: 78 MMHG | OXYGEN SATURATION: 96 %

## 2022-04-25 DIAGNOSIS — E78.2 MIXED HYPERLIPIDEMIA: ICD-10-CM

## 2022-04-25 DIAGNOSIS — F41.9 ANXIETY: ICD-10-CM

## 2022-04-25 DIAGNOSIS — G47.33 OBSTRUCTIVE SLEEP APNEA SYNDROME: ICD-10-CM

## 2022-04-25 DIAGNOSIS — E11.65 TYPE 2 DIABETES MELLITUS WITH HYPERGLYCEMIA, WITHOUT LONG-TERM CURRENT USE OF INSULIN (HCC): Primary | ICD-10-CM

## 2022-04-25 PROCEDURE — 99214 OFFICE O/P EST MOD 30 MIN: CPT | Performed by: INTERNAL MEDICINE

## 2022-04-25 RX ORDER — ALPRAZOLAM 0.25 MG/1
TABLET ORAL
Qty: 90 TABLET | Refills: 2 | Status: SHIPPED
Start: 2022-04-25 | End: 2022-06-16 | Stop reason: SDUPTHER

## 2022-04-25 ASSESSMENT — ENCOUNTER SYMPTOMS
COLOR CHANGE: 0
SORE THROAT: 0
ABDOMINAL PAIN: 0
CONSTIPATION: 0
COUGH: 0
BACK PAIN: 0
SHORTNESS OF BREATH: 0
CHEST TIGHTNESS: 0
DIARRHEA: 0
NAUSEA: 0

## 2022-04-25 ASSESSMENT — PATIENT HEALTH QUESTIONNAIRE - PHQ9
SUM OF ALL RESPONSES TO PHQ QUESTIONS 1-9: 1
1. LITTLE INTEREST OR PLEASURE IN DOING THINGS: 0
SUM OF ALL RESPONSES TO PHQ QUESTIONS 1-9: 1
2. FEELING DOWN, DEPRESSED OR HOPELESS: 1
SUM OF ALL RESPONSES TO PHQ QUESTIONS 1-9: 1
SUM OF ALL RESPONSES TO PHQ QUESTIONS 1-9: 1
SUM OF ALL RESPONSES TO PHQ9 QUESTIONS 1 & 2: 1

## 2022-04-25 NOTE — PROGRESS NOTES
19    Name: Gallito Donnelly     :1965      Sex:male       Age : 55 y.o. Chief Complaint   Patient presents with    Diabetes        He had an episode of chest pain. He describes this as sharp and stabbing. It was anterior and did not radiate to his shoulder or neck. He was not short of breath ,diaphoretic or dizzy with this episode. He has no family history of premature heart disease. Not precipitated by exertion, went away on his own. EKG was unremarkable. We will schedule a stress test however that was not done and does not feel it is necessary as he has only had 1 episodes since I saw him 3 months ago. He was diagnosed with type 2 diabetes mellitus . His hemoglobin A1c In 2020 was 7.1% his fasting blood sugar was 138. Heddy  He opted to try diet and exercise first and is been watching his diet. He has lost 14 pounds since his last office visit. In 2021 his hemoglobin A1c was down to 6.8%. He was started on Metformin 500 mg with breakfast.  His hemoglobin A1c in 2021 was 6.3%. He has lost another about 15 pounds in the last 3 months. We will check his hemoglobin A1c and urine microalbumin  He needs to get his diabetic eye examination. On 40 mg of simvastatin daily his total cholesterol dropped to 191. His triglycerides dropped to 130, LDL cholesterol is 116 and HDL cholesterol is 49. Fasting blood sugar was 107. Kidney and liver blood work was normal.  No excess protein in his urine. PSA was 0.42. Fit test was negative for occult blood      He had his diabetic eye examination at [facility] in [place]. Last year we will get reports. He refuses other immunizations or flu shots. He got COVID-19 vaccine series. He had his COVID-19 booster. He had his Pneumovax     Patient has a history of anxiety. He takes alprazolam on a regular basis 0.25 mg 3 times daily.   We discussed the use of medications such as Cymbalta or Effexor which he says he can take based on his gene study. Other ones that he is not supposed to take include all the regular ones such as Wellbutrin Zoloft Paxil Prozac Lexapro and Celexa. He signed his medication agreement. This was reviewed with him. We reviewed his controlled substance monitoring. He signed his medication agreement. He is aware of the possible medication side effects tolerance, addiction. He does not want to go on any other medications at this time. No sign of drug seeking or diversion identified. OARRS report reviewed and he last filled his alprazolam 0.25 mg on , April 19, 2022. He did some type of gene site test for psychotropic medications and he gave me a copy of the report. Alprazolam was 1 of the medications that was acceptable. He did have a decreased folic acid level according to him and so  he started on over-the-counter folate  Daily        He does not want to have a colonoscopy. He did  a fit test.  This was negative for occult blood     he last saw his urologist about a year ago. He had a penile implant about a year ago which works well. He has a history of obstructive sleep apnea and uses a CPAP nightly. Over the last few months he started sneezing at about 3 AM in the morning. Thinks is from his CPAP machine. He may need new equipment for his CPAP as its been over 12 years since his last study we discussed getting a new sleep study and he will think about it. He has had problems with sneezing, congestion and is ears feel full. We talked about allergies and treatment for it. We will start him on plain Zyrtec or Allegra and Flonase nasal spray and see how he does with that. .        Review of Systems   Constitutional: Negative for fatigue and fever. HENT: Positive for congestion and sneezing. Negative for dental problem and sore throat. Ears feel like there is fluid in them   Respiratory: Negative for cough, chest tightness and shortness of breath.     Cardiovascular: Negative for chest pain, palpitations and leg swelling. Gastrointestinal: Negative for abdominal pain, constipation, diarrhea and nausea. Endocrine: Negative for cold intolerance and heat intolerance. Genitourinary: Negative for difficulty urinating, dysuria, hematuria and urgency. Musculoskeletal: Negative for arthralgias and back pain. Skin: Negative for color change and pallor. Neurological: Negative for dizziness, seizures, weakness, light-headedness, numbness and headaches. Hematological: Does not bruise/bleed easily. Psychiatric/Behavioral: Negative for confusion and sleep disturbance. The patient is not nervous/anxious.            Current Outpatient Medications:     ALPRAZolam (XANAX) 0.25 MG tablet, TAKE 1 TABLET BY MOUTH 3 TIMES DAILY AS NEEDED FOR ANXIETY FOR UP TO 30 DAYS, Disp: 90 tablet, Rfl: 2    simvastatin (ZOCOR) 40 MG tablet, TAKE 1 TABLET BY MOUTH EVERY DAY AT NIGHT, Disp: 90 tablet, Rfl: 1    metFORMIN (GLUCOPHAGE) 500 MG tablet, Take 1 tablet by mouth daily (with breakfast), Disp: 30 tablet, Rfl: 5    NONFORMULARY, CBD oil, Disp: , Rfl:     Multiple Vitamin (MULTI-DAY PO), Take by mouth, Disp: , Rfl:   Medications Discontinued During This Encounter   Medication Reason    ALPRAZolam (XANAX) 0.25 MG tablet REORDER       Allergies   Allergen Reactions    Buspar [Buspirone]     Lexapro  [Escitalopram Oxalate]     Venlafaxine     Zoloft  [Sertraline Hcl]        Past Medical History:   Diagnosis Date    Anxiety     Erectile dysfunction     Hyperlipidemia     Hypogonadism in male     Sleep apnea       Immunization History   Administered Date(s) Administered    COVID-19, Pfizer Purple top, DILUTE for use, 12+ yrs, 30mcg/0.3mL dose 03/28/2021, 04/19/2021, 12/07/2021    Pneumococcal Polysaccharide ([id number]) 10/27/2021        Vitals:    04/25/22 0705   BP: 118/78   Pulse: 77   Temp: 97.2 °F (36.2 °C)   TempSrc: Temporal   SpO2: 96%   Weight: 222 lb (100.7 kg) Physical Exam  Vitals reviewed. Constitutional:       General: He is not in acute distress. Appearance: Normal appearance. He is well-developed. He is obese. He is not ill-appearing. HENT:      Head: Normocephalic and atraumatic. Right Ear: Ear canal and external ear normal. There is impacted cerumen. Left Ear: Tympanic membrane, ear canal and external ear normal.      Nose: Nose normal. No congestion. Mouth/Throat:      Mouth: Mucous membranes are moist.      Comments: Clear postnasal drainage  Eyes:      General: No scleral icterus. Right eye: No discharge. Left eye: No discharge. Pupils: Pupils are equal, round, and reactive to light. Neck:      Thyroid: Thyromegaly present. Vascular: No JVD. Cardiovascular:      Rate and Rhythm: Normal rate and regular rhythm. Heart sounds: No murmur heard. Pulmonary:      Effort: Pulmonary effort is normal.      Breath sounds: Normal breath sounds. No wheezing or rales. Abdominal:      General: Bowel sounds are normal. There is no distension. Palpations: Abdomen is soft. Tenderness: There is no abdominal tenderness. Musculoskeletal:         General: No deformity. Normal range of motion. Cervical back: Normal range of motion and neck supple. Lymphadenopathy:      Cervical: No cervical adenopathy. Skin:     General: Skin is warm and dry. Coloration: Skin is not pale. Findings: No erythema. Neurological:      General: No focal deficit present. Mental Status: He is alert and oriented to person, place, and time. Motor: No weakness or abnormal muscle tone. Gait: Gait normal.      Deep Tendon Reflexes: Reflexes normal.   Psychiatric:         Mood and Affect: Mood normal.         Behavior: Behavior normal.         Thought Content:  Thought content normal.         Judgment: Judgment normal.          Problem List        Respiratory    Sleep apnea       Use CPAP daily Endocrine    Type 2 diabetes mellitus with hyperglycemia, without long-term current use of insulin (HCC) - Primary       watch diet, check blood sugars, continue metformin, return for hemoglobin A1c, CMP and urine microalbumin creatinine ratio         Relevant Medications    metFORMIN (GLUCOPHAGE) 500 MG tablet    Other Relevant Orders    Comprehensive Metabolic Panel    Hemoglobin A1C    Microalbumin / Creatinine Urine Ratio       Other    Anxiety       OARRS report reviewed and his use is appropriate, prescription management         Relevant Medications    ALPRAZolam (XANAX) 0.25 MG tablet    Hyperlipidemia       watch diet, continue simvastatin and will check lipids          Relevant Medications    simvastatin (ZOCOR) 40 MG tablet    Other Relevant Orders    Comprehensive Metabolic Panel    Lipid Panel        Return in 3 months for medication refill and monitoring.     Seen by:   Gladys Verduzco DO

## 2022-04-25 NOTE — ASSESSMENT & PLAN NOTE
watch diet, check blood sugars, continue metformin, return for hemoglobin A1c, CMP and urine microalbumin creatinine ratio

## 2022-06-16 DIAGNOSIS — F41.9 ANXIETY: ICD-10-CM

## 2022-06-16 RX ORDER — ALPRAZOLAM 0.25 MG/1
TABLET ORAL
Qty: 90 TABLET | Refills: 2 | Status: SHIPPED
Start: 2022-06-16 | End: 2022-07-25 | Stop reason: SDUPTHER

## 2022-06-16 RX ORDER — ALPRAZOLAM 0.25 MG/1
TABLET ORAL
Qty: 90 TABLET | OUTPATIENT
Start: 2022-06-16

## 2022-06-16 NOTE — TELEPHONE ENCOUNTER
Last Appointment:  4/25/2022  Future Appointments   Date Time Provider Harrison Gamble   7/25/2022  7:15 AM [facility] [place]

## 2022-07-13 LAB — DIABETIC RETINOPATHY: NEGATIVE

## 2022-07-25 ENCOUNTER — TELEPHONE (OUTPATIENT)
Dept: PRIMARY CARE CLINIC | Age: 57
End: 2022-07-25

## 2022-07-25 ENCOUNTER — OFFICE VISIT (OUTPATIENT)
Dept: PRIMARY CARE CLINIC | Age: 57
End: 2022-07-25
Payer: COMMERCIAL

## 2022-07-25 VITALS
DIASTOLIC BLOOD PRESSURE: 70 MMHG | WEIGHT: 225 LBS | BODY MASS INDEX: 32.28 KG/M2 | SYSTOLIC BLOOD PRESSURE: 115 MMHG | HEART RATE: 74 BPM | OXYGEN SATURATION: 97 % | TEMPERATURE: 97.1 F

## 2022-07-25 DIAGNOSIS — E11.65 TYPE 2 DIABETES MELLITUS WITH HYPERGLYCEMIA, WITHOUT LONG-TERM CURRENT USE OF INSULIN (HCC): Primary | ICD-10-CM

## 2022-07-25 DIAGNOSIS — F41.9 ANXIETY: ICD-10-CM

## 2022-07-25 DIAGNOSIS — E78.2 MIXED HYPERLIPIDEMIA: ICD-10-CM

## 2022-07-25 PROCEDURE — 99214 OFFICE O/P EST MOD 30 MIN: CPT | Performed by: INTERNAL MEDICINE

## 2022-07-25 RX ORDER — ALPRAZOLAM 0.25 MG/1
TABLET ORAL
Qty: 90 TABLET | Refills: 2 | Status: SHIPPED
Start: 2022-07-25 | End: 2022-11-01 | Stop reason: SDUPTHER

## 2022-07-25 RX ORDER — SIMVASTATIN 40 MG
TABLET ORAL
Qty: 90 TABLET | Refills: 1 | Status: SHIPPED
Start: 2022-07-25 | End: 2022-11-01 | Stop reason: SDUPTHER

## 2022-07-25 ASSESSMENT — ENCOUNTER SYMPTOMS
ABDOMINAL PAIN: 0
CONSTIPATION: 0
CHEST TIGHTNESS: 0
SORE THROAT: 0
COUGH: 0
DIARRHEA: 0
SHORTNESS OF BREATH: 0
BACK PAIN: 0
COLOR CHANGE: 0
NAUSEA: 0

## 2022-07-25 NOTE — PROGRESS NOTES
19    Name: Samson Roberts     :1965      Sex:male       Age : 55 y.o. Chief Complaint   Patient presents with    Hypertension    Diabetes          He was diagnosed with type 2 diabetes mellitus . His hemoglobin A1c In 2020 was 7.1% his fasting blood sugar was 138. [place] He opted to try diet and exercise first and is been watching his diet. He has lost 14 pounds since his last office visit. In 2021 his hemoglobin A1c was down to 6.8%. He was started on Metformin 500 mg with breakfast.  His hemoglobin A1c in 2021 was 6.3%. He has lost another about 15 pounds in the last 3 months. We will check his hemoglobin A1c and urine microalbumin  He had his diabetic eye examination at [facility]. On 40 mg of simvastatin daily his total cholesterol dropped to 191. His triglycerides dropped to 130, LDL cholesterol is 116 and HDL cholesterol is 49. Fasting blood sugar was 107. Kidney and liver blood work was normal.  No excess protein in his urine. PSA was 0.42. Fit test was negative for occult blood      He refuses other immunizations or flu shots. He got COVID-19 vaccine series. He had his COVID-19 booster. He had his Pneumovax     Patient has a history of anxiety. He takes alprazolam on a regular basis 0.25 mg 3 times daily. We discussed the use of medications such as Cymbalta or Effexor which he says he can take based on his gene study. Other ones that he is not supposed to take include all the regular ones such as Wellbutrin Zoloft Paxil Prozac Lexapro and Celexa. He signed his medication agreement. This was reviewed with him. We reviewed his controlled substance monitoring. He signed his medication agreement. He is aware of the possible medication side effects tolerance, addiction. He does not want to go on any other medications at this time. No sign of drug seeking or diversion identified.   OARRS report reviewed and he last filled his alprazolam 0.25 mg on , April 19 June 6,, 2022. He did some type of gene site test for psychotropic medications and he gave me a copy of the report. Alprazolam was 1 of the medications that was acceptable. He did have a decreased folic acid level according to him and so  he started on over-the-counter folate  Daily    He needs to return for his fasting blood work      He does not want to have a colonoscopy. He did  a fit test.  This was negative for occult blood     he last saw his urologist about a year ago. He had a penile implant about a year ago which works well. He has a history of obstructive sleep apnea and uses a CPAP nightly. Over the last few months he started sneezing at about 3 AM in the morning. Thinks is from his CPAP machine. He may need new equipment for his CPAP as its been over 12 years since his last study we discussed getting a new sleep study and he will think about it. He may need to go back to Dr. Denny Robles for a new prescription    He has had problems with sneezing,   We talked about allergies and treatment for it. We will start him on plain Zyrtec or Allegra and Flonase nasal spray and see how he does with that. .        Review of Systems   Constitutional:  Negative for fatigue and fever. HENT:  Positive for sneezing. Negative for congestion, dental problem and sore throat. Respiratory:  Negative for cough, chest tightness and shortness of breath. Cardiovascular:  Negative for chest pain, palpitations and leg swelling. Gastrointestinal:  Negative for abdominal pain, constipation, diarrhea and nausea. Endocrine: Negative for cold intolerance and heat intolerance. Genitourinary:  Negative for difficulty urinating, dysuria, hematuria and urgency. Musculoskeletal:  Negative for arthralgias and back pain. Skin:  Negative for color change and pallor. Neurological:  Negative for dizziness, seizures, weakness, light-headedness, numbness and headaches.    Hematological: Does not bruise/bleed easily. Psychiatric/Behavioral:  Negative for confusion and sleep disturbance. The patient is not nervous/anxious. Current Outpatient Medications:     metFORMIN (GLUCOPHAGE) 500 MG tablet, Take 1 tablet by mouth daily (with breakfast), Disp: 90 tablet, Rfl: 1    simvastatin (ZOCOR) 40 MG tablet, TAKE 1 TABLET BY MOUTH EVERY DAY AT NIGHT, Disp: 90 tablet, Rfl: 1    ALPRAZolam (XANAX) 0.25 MG tablet, TAKE 1 TABLET BY MOUTH 3 TIMES DAILY AS NEEDED FOR ANXIETY FOR UP TO 30 DAYS, Disp: 90 tablet, Rfl: 2    NONFORMULARY, CBD oil, Disp: , Rfl:     Multiple Vitamin (MULTI-DAY PO), Take by mouth, Disp: , Rfl:   Medications Discontinued During This Encounter   Medication Reason    simvastatin (ZOCOR) 40 MG tablet REORDER    metFORMIN (GLUCOPHAGE) 500 MG tablet REORDER    ALPRAZolam (XANAX) 0.25 MG tablet REORDER       Allergies   Allergen Reactions    Buspar [Buspirone]     Lexapro  [Escitalopram Oxalate]     Venlafaxine     Zoloft  [Sertraline Hcl]        Past Medical History:   Diagnosis Date    Anxiety     Erectile dysfunction     Hyperlipidemia     Hypogonadism in male     Sleep apnea       Immunization History   Administered Date(s) Administered    COVID-19, PFIZER PURPLE top, DILUTE for use, (age 15 y+), 30mcg/0.3mL 03/28/2021, 04/19/2021, 12/07/2021    Pneumococcal Polysaccharide ([id number]) 10/27/2021    Zoster Recombinant (Shingrix) 07/07/2022        Vitals:    07/25/22 0720   BP: 115/70   Pulse: 74   Temp: 97.1 °F (36.2 °C)   TempSrc: Temporal   SpO2: 97%   Weight: 225 lb (102.1 kg)       Physical Exam  Vitals reviewed. Constitutional:       General: He is not in acute distress. Appearance: Normal appearance. He is well-developed. He is obese. He is not ill-appearing. HENT:      Head: Normocephalic and atraumatic. Right Ear: Ear canal and external ear normal. There is impacted cerumen.       Left Ear: Tympanic membrane, ear canal and external ear normal.      Nose: Nose normal. No congestion. Mouth/Throat:      Mouth: Mucous membranes are moist.      Comments: Clear postnasal drainage  Eyes:      General: No scleral icterus. Right eye: No discharge. Left eye: No discharge. Pupils: Pupils are equal, round, and reactive to light. Neck:      Thyroid: Thyromegaly present. Vascular: No JVD. Cardiovascular:      Rate and Rhythm: Normal rate and regular rhythm. Heart sounds: No murmur heard. Pulmonary:      Effort: Pulmonary effort is normal.      Breath sounds: Normal breath sounds. No wheezing or rales. Abdominal:      General: Bowel sounds are normal. There is no distension. Palpations: Abdomen is soft. Tenderness: There is no abdominal tenderness. Musculoskeletal:         General: No deformity. Normal range of motion. Cervical back: Normal range of motion and neck supple. Lymphadenopathy:      Cervical: No cervical adenopathy. Skin:     General: Skin is warm and dry. Coloration: Skin is not pale. Findings: No erythema. Neurological:      General: No focal deficit present. Mental Status: He is alert and oriented to person, place, and time. Motor: No weakness or abnormal muscle tone. Gait: Gait normal.      Deep Tendon Reflexes: Reflexes normal.   Psychiatric:         Mood and Affect: Mood normal.         Behavior: Behavior normal.         Thought Content: Thought content normal.         Judgment: Judgment normal.        Problem List          Endocrine    Type 2 diabetes mellitus with hyperglycemia, without long-term current use of insulin ([address] - Primary       check blood sugars, check hemoglobin A1c and urine/microalbumin creatinine ratio. Continue metformin 500 mg every morning.   He has no hypoglycemic episodes         Relevant Medications    metFORMIN (GLUCOPHAGE) 500 MG tablet    Other Relevant Orders    Comprehensive Metabolic Panel    Lipid Panel    Hemoglobin A1C    Microalbumin / Creatinine Urine Ratio       Other    Anxiety       OARRS report reviewed and his use is appropriate. Prescription management and he will come back in 3 months         Relevant Medications    ALPRAZolam (XANAX) 0.25 MG tablet    Hyperlipidemia     Watch saturated fats in diet and will monitor lipids. Continue simvastatin 40 mg daily prescription management         Relevant Medications    simvastatin (ZOCOR) 40 MG tablet    Other Relevant Orders    Comprehensive Metabolic Panel    Lipid Panel     Return in 3 months for medication refill and monitoring.     Seen by:   Beto Crockett DO

## 2022-07-25 NOTE — ASSESSMENT & PLAN NOTE
Watch saturated fats in diet and will monitor lipids.   Continue simvastatin 40 mg daily prescription management

## 2022-07-25 NOTE — ASSESSMENT & PLAN NOTE
OARRS report reviewed and his use is appropriate.   Prescription management and he will come back in 3 months

## 2022-07-25 NOTE — ASSESSMENT & PLAN NOTE
check blood sugars, check hemoglobin A1c and urine/microalbumin creatinine ratio. Continue metformin 500 mg every morning.   He has no hypoglycemic episodes

## 2022-11-01 ENCOUNTER — OFFICE VISIT (OUTPATIENT)
Dept: PRIMARY CARE CLINIC | Age: 57
End: 2022-11-01
Payer: COMMERCIAL

## 2022-11-01 VITALS
HEART RATE: 97 BPM | WEIGHT: 225 LBS | SYSTOLIC BLOOD PRESSURE: 124 MMHG | BODY MASS INDEX: 32.21 KG/M2 | DIASTOLIC BLOOD PRESSURE: 74 MMHG | HEIGHT: 70 IN | TEMPERATURE: 97.8 F | OXYGEN SATURATION: 97 %

## 2022-11-01 DIAGNOSIS — E78.2 MIXED HYPERLIPIDEMIA: ICD-10-CM

## 2022-11-01 DIAGNOSIS — E11.65 TYPE 2 DIABETES MELLITUS WITH HYPERGLYCEMIA, WITHOUT LONG-TERM CURRENT USE OF INSULIN (HCC): Primary | ICD-10-CM

## 2022-11-01 DIAGNOSIS — E11.65 TYPE 2 DIABETES MELLITUS WITH HYPERGLYCEMIA, WITHOUT LONG-TERM CURRENT USE OF INSULIN (HCC): ICD-10-CM

## 2022-11-01 DIAGNOSIS — H61.21 IMPACTED CERUMEN OF RIGHT EAR: ICD-10-CM

## 2022-11-01 DIAGNOSIS — Z12.5 SCREENING FOR PROSTATE CANCER: ICD-10-CM

## 2022-11-01 DIAGNOSIS — F41.9 ANXIETY: ICD-10-CM

## 2022-11-01 DIAGNOSIS — J06.9 VIRAL URI: ICD-10-CM

## 2022-11-01 LAB
ALBUMIN SERPL-MCNC: 4.6 G/DL (ref 3.5–5.2)
ALP BLD-CCNC: 73 U/L (ref 40–129)
ALT SERPL-CCNC: 17 U/L (ref 0–40)
ANION GAP SERPL CALCULATED.3IONS-SCNC: 16 MMOL/L (ref 7–16)
AST SERPL-CCNC: 15 U/L (ref 0–39)
BILIRUB SERPL-MCNC: 0.5 MG/DL (ref 0–1.2)
BUN BLDV-MCNC: 16 MG/DL (ref 6–20)
CALCIUM SERPL-MCNC: 9.7 MG/DL (ref 8.6–10.2)
CHLORIDE BLD-SCNC: 102 MMOL/L (ref 98–107)
CHOLESTEROL, TOTAL: 206 MG/DL (ref 0–199)
CO2: 24 MMOL/L (ref 22–29)
CREAT SERPL-MCNC: 1.1 MG/DL (ref 0.7–1.2)
CREATININE URINE: 151 MG/DL (ref 40–278)
GFR SERPL CREATININE-BSD FRML MDRD: >60 ML/MIN/1.73
GLUCOSE BLD-MCNC: 118 MG/DL (ref 74–99)
HBA1C MFR BLD: 6.5 % (ref 4–5.6)
HDLC SERPL-MCNC: 47 MG/DL
LDL CHOLESTEROL CALCULATED: 127 MG/DL (ref 0–99)
MICROALBUMIN UR-MCNC: <12 MG/L
MICROALBUMIN/CREAT UR-RTO: ABNORMAL (ref 0–30)
POTASSIUM SERPL-SCNC: 4.3 MMOL/L (ref 3.5–5)
PROSTATE SPECIFIC ANTIGEN: 0.38 NG/ML (ref 0–4)
SODIUM BLD-SCNC: 142 MMOL/L (ref 132–146)
TOTAL PROTEIN: 7.7 G/DL (ref 6.4–8.3)
TRIGL SERPL-MCNC: 158 MG/DL (ref 0–149)
VLDLC SERPL CALC-MCNC: 32 MG/DL

## 2022-11-01 PROCEDURE — 99214 OFFICE O/P EST MOD 30 MIN: CPT | Performed by: INTERNAL MEDICINE

## 2022-11-01 RX ORDER — SIMVASTATIN 40 MG
TABLET ORAL
Qty: 90 TABLET | Refills: 1 | Status: SHIPPED | OUTPATIENT
Start: 2022-11-01

## 2022-11-01 RX ORDER — ALPRAZOLAM 0.25 MG/1
TABLET ORAL
Qty: 90 TABLET | Refills: 2 | Status: SHIPPED | OUTPATIENT
Start: 2022-11-01 | End: 2023-11-01

## 2022-11-01 SDOH — ECONOMIC STABILITY: FOOD INSECURITY: WITHIN THE PAST 12 MONTHS, YOU WORRIED THAT YOUR FOOD WOULD RUN OUT BEFORE YOU GOT MONEY TO BUY MORE.: NEVER TRUE

## 2022-11-01 SDOH — ECONOMIC STABILITY: FOOD INSECURITY: WITHIN THE PAST 12 MONTHS, THE FOOD YOU BOUGHT JUST DIDN'T LAST AND YOU DIDN'T HAVE MONEY TO GET MORE.: NEVER TRUE

## 2022-11-01 ASSESSMENT — SOCIAL DETERMINANTS OF HEALTH (SDOH): HOW HARD IS IT FOR YOU TO PAY FOR THE VERY BASICS LIKE FOOD, HOUSING, MEDICAL CARE, AND HEATING?: NOT VERY HARD

## 2022-11-01 ASSESSMENT — ENCOUNTER SYMPTOMS
COLOR CHANGE: 0
COUGH: 1
DIARRHEA: 0
NAUSEA: 0
SHORTNESS OF BREATH: 0
CHEST TIGHTNESS: 0
CONSTIPATION: 0
SORE THROAT: 0
ABDOMINAL PAIN: 0
BACK PAIN: 0

## 2022-11-01 NOTE — ASSESSMENT & PLAN NOTE
not at goal, last LDL still over 100. In a diabetic it should be around [de-identified]. He continues on his simvastatin 40 mg. We will check fasting lipid profile and liver enzymes today. If still not at goal we may need to increase his simvastatin to higher dose or switch him to a more potent statin such as rosuvastatin or atorvastatin.   He is to continue his diet

## 2022-11-01 NOTE — ASSESSMENT & PLAN NOTE
symptomatic treatment with Claritin over-the-counter or Claritin-D, Mucinex for cough, Tylenol for aches and pains.   If he develops systemic symptoms such as high fever, shortness of breath, productive cough etc. he should come to express care or call me

## 2022-11-01 NOTE — ASSESSMENT & PLAN NOTE
OARRS report reviewed and he received alprazolam on 10/26/2022 so he will not get his prescription filled until 11/26/2022. Chronic checkpoints noted and he is aware that this is  an addictive medication, he is aware of side effects of hypersomnolence. He does not have any side effects.   He will return in 3 months for medication monitoring and refills

## 2022-11-01 NOTE — ASSESSMENT & PLAN NOTE
not at goal.  Patient has some low blood sugar spells when he does not eat properly. Most the time he runs around 120 fasting. He will continue his metformin 500 mg a day in his diet. Encouraged him to eat appropriately and not skip meals.   Check hemoglobin A1c, fasting CMP and urine microalbumin creatinine ratio

## 2022-12-01 PROBLEM — Z12.5 SCREENING FOR PROSTATE CANCER: Status: RESOLVED | Noted: 2019-10-09 | Resolved: 2022-12-01

## 2023-01-15 NOTE — PROGRESS NOTES
19    Name: Annabella Alejandre     :1965      Sex:male       Age : 65 y.o. Chief Complaint   Patient presents with    Diabetes      Over the past 24 hours he complained of nasal congestion and sneezing. Occasional cough but no sore throat, earache, fever, chills nausea vomiting or diaphoresis. He was diagnosed with type 2 diabetes mellitus . His hemoglobin A1c In 2020 was 7.1% his fasting blood sugar was 138. [place] He opted to try diet and exercise first and is been watching his diet. He has lost 14 pounds since his last office visit. In 2021 his hemoglobin A1c was down to 6.8%. He was started on Metformin 500 mg with breakfast.  His hemoglobin A1c in 2021 was 6.3%. He has lost another about 15 pounds in the last 3 months. We will check his hemoglobin A1c and urine microalbumin  Sometimes when he skips meals he gets hypoglycemic. Most the time his fasting blood sugars around 120. I have asked him to not skip meals and eat appropriately continue his diet and medications. He had his diabetic eye examination at [facility]. On 40 mg of simvastatin daily his total cholesterol dropped to 191. His triglycerides dropped to 130, LDL cholesterol is 116 and HDL cholesterol is 49. Fasting blood sugar was 107. Kidney and liver blood work was normal.  No excess protein in his urine. PSA was 0.42. Fit test was negative for occult blood      He refuses other immunizations or flu shots. He got COVID-19 vaccine series. He had his COVID-19 booster. He had his Pneumovax     Patient has a history of anxiety. He takes alprazolam on a regular basis 0.25 mg 3 times daily. We discussed the use of medications such as Cymbalta or Effexor which he says he can take based on his gene study. Other ones that he is not supposed to take include all the regular ones such as Wellbutrin Zoloft Paxil Prozac Lexapro and Celexa. He signed his medication agreement.   This was reviewed with him. We reviewed his controlled substance monitoring. He signed his medication agreement. He is aware of the possible medication side effects tolerance, addiction. He does not want to go on any other medications at this time. No sign of drug seeking or diversion identified. OARRS report reviewed and he last filled his alprazolam 0.25 mg on , Oct 26,, 2022. He did some type of gene site test for psychotropic medications and he gave me a copy of the report. Alprazolam was 1 of the medications that was acceptable. He did have a decreased folic acid level according to him and so  he started on over-the-counter folate  Andie      He does not want to have a colonoscopy. He did  a fit test.  This was negative for occult blood     he last saw his urologist about a year ago. He had a penile implant about a year ago which works well. He has a history of obstructive sleep apnea and uses a CPAP nightly. He uses this nightly and does well with it    He has had problems with sneezing,   We talked about allergies and treatment for it. We will start him on plain Zyrtec or Allegra and Flonase nasal spray and see how he does with that. .        Review of Systems   Constitutional:  Negative for fatigue and fever. HENT:  Positive for congestion and sneezing. Negative for dental problem and sore throat. Respiratory:  Positive for cough. Negative for chest tightness and shortness of breath. Cardiovascular:  Negative for chest pain, palpitations and leg swelling. Gastrointestinal:  Negative for abdominal pain, constipation, diarrhea and nausea. Endocrine: Negative for cold intolerance and heat intolerance. Genitourinary:  Negative for difficulty urinating, dysuria, hematuria and urgency. Musculoskeletal:  Negative for arthralgias and back pain. Skin:  Negative for color change and pallor. Neurological:  Negative for dizziness, seizures, weakness, light-headedness, numbness and headaches. Hematological:  Does not bruise/bleed easily. Psychiatric/Behavioral:  Negative for confusion and sleep disturbance. The patient is not nervous/anxious. Current Outpatient Medications:     metFORMIN (GLUCOPHAGE) 500 MG tablet, Take 1 tablet by mouth daily (with breakfast), Disp: 90 tablet, Rfl: 1    ALPRAZolam (XANAX) 0.25 MG tablet, TAKE 1 TABLET BY MOUTH 3 TIMES DAILY AS NEEDED FOR ANXIETY FOR UP TO 30 DAYS, Disp: 90 tablet, Rfl: 2    simvastatin (ZOCOR) 40 MG tablet, TAKE 1 TABLET BY MOUTH EVERY DAY AT NIGHT, Disp: 90 tablet, Rfl: 1    NONFORMULARY, CBD oil, Disp: , Rfl:   Medications Discontinued During This Encounter   Medication Reason    Multiple Vitamin (MULTI-DAY PO) LIST CLEANUP    metFORMIN (GLUCOPHAGE) 500 MG tablet REORDER    simvastatin (ZOCOR) 40 MG tablet REORDER    ALPRAZolam (XANAX) 0.25 MG tablet REORDER       Allergies   Allergen Reactions    Buspar [Buspirone]     Lexapro  [Escitalopram Oxalate]     Venlafaxine     Zoloft  [Sertraline Hcl]        Past Medical History:   Diagnosis Date    Anxiety     Erectile dysfunction     Hyperlipidemia     Hypogonadism in male     Sleep apnea       Immunization History   Administered Date(s) Administered    COVID-19, PFIZER PURPLE top, DILUTE for use, (age 15 y+), 30mcg/0.3mL 03/28/2021, 04/19/2021, 12/07/2021    Pneumococcal Polysaccharide ([id number]) 10/27/2021    Zoster Recombinant (Shingrix) 07/07/2022        Vitals:    11/01/22 0824   BP: 124/74   Pulse: 97   Temp: 97.8 °F (36.6 °C)   SpO2: 97%   Weight: 225 lb (102.1 kg)   Height: 5' 10\" (1.778 m)       Physical Exam  Vitals reviewed. Constitutional:       General: He is not in acute distress. Appearance: Normal appearance. He is well-developed. He is obese. He is not ill-appearing. HENT:      Head: Normocephalic and atraumatic. Right Ear: Ear canal and external ear normal. There is impacted cerumen.       Left Ear: Tympanic membrane, ear canal and external ear normal. There is no impacted cerumen. Nose: Nose normal. No congestion. Mouth/Throat:      Mouth: Mucous membranes are moist.      Comments: Clear postnasal drainage  Eyes:      General: No scleral icterus. Right eye: No discharge. Left eye: No discharge. Pupils: Pupils are equal, round, and reactive to light. Neck:      Thyroid: Thyromegaly present. Vascular: No JVD. Cardiovascular:      Rate and Rhythm: Normal rate and regular rhythm. Heart sounds: No murmur heard. Pulmonary:      Effort: Pulmonary effort is normal.      Breath sounds: Normal breath sounds. No wheezing or rales. Abdominal:      General: Bowel sounds are normal. There is no distension. Palpations: Abdomen is soft. Tenderness: There is no abdominal tenderness. Musculoskeletal:         General: No deformity. Normal range of motion. Cervical back: Normal range of motion and neck supple. Lymphadenopathy:      Cervical: No cervical adenopathy. Skin:     General: Skin is warm and dry. Coloration: Skin is not pale. Findings: No erythema. Neurological:      General: No focal deficit present. Mental Status: He is alert and oriented to person, place, and time. Motor: No weakness or abnormal muscle tone. Gait: Gait normal.      Deep Tendon Reflexes: Reflexes normal.   Psychiatric:         Mood and Affect: Mood normal.         Behavior: Behavior normal.         Judgment: Judgment normal.        Problem List          Respiratory    Viral URI       symptomatic treatment with Claritin over-the-counter or Claritin-D, Mucinex for cough, Tylenol for aches and pains.   If he develops systemic symptoms such as high fever, shortness of breath, productive cough etc. he should come to express care or call me            Endocrine    Type 2 diabetes mellitus with hyperglycemia, without long-term current use of insulin ([facility] [address] - Primary       not at goal.  Patient has some low blood sugar spells when he does not eat properly. Most the time he runs around 120 fasting. He will continue his metformin 500 mg a day in his diet. Encouraged him to eat appropriately and not skip meals. Check hemoglobin A1c, fasting CMP and urine microalbumin creatinine ratio         Relevant Medications    metFORMIN (GLUCOPHAGE) 500 MG tablet    Other Relevant Orders    Microalbumin / Creatinine Urine Ratio    Hemoglobin A1C    Comprehensive Metabolic Panel       Nervous and Auditory    Impacted cerumen of right ear       removal of cerumen done by flushing. Patient tolerated procedure well         Relevant Orders    Ear wax removal       Other    Anxiety       OARRS report reviewed and he received alprazolam on 10/26/2022 so he will not get his prescription filled until 11/26/2022. Chronic checkpoints noted and he is aware that this is  an addictive medication, he is aware of side effects of hypersomnolence. He does not have any side effects. He will return in 3 months for medication monitoring and refills         Relevant Medications    ALPRAZolam (XANAX) 0.25 MG tablet    Hyperlipidemia       not at goal, last LDL still over 100. In a diabetic it should be around [de-identified]. He continues on his simvastatin 40 mg. We will check fasting lipid profile and liver enzymes today. If still not at goal we may need to increase his simvastatin to higher dose or switch him to a more potent statin such as rosuvastatin or atorvastatin. He is to continue his diet         Relevant Medications    simvastatin (ZOCOR) 40 MG tablet    Other Relevant Orders    Lipid Panel    Comprehensive Metabolic Panel    Screening for prostate cancer       screening PSA ordered         Relevant Orders    PSA Screening     Return in 3 months for medication refill and monitoring. DM and HL    Seen by:   Omid Hart DO no

## 2023-01-24 ENCOUNTER — OFFICE VISIT (OUTPATIENT)
Dept: PRIMARY CARE CLINIC | Age: 58
End: 2023-01-24
Payer: COMMERCIAL

## 2023-01-24 VITALS
WEIGHT: 224 LBS | BODY MASS INDEX: 32.07 KG/M2 | DIASTOLIC BLOOD PRESSURE: 78 MMHG | HEIGHT: 70 IN | TEMPERATURE: 97.9 F | HEART RATE: 83 BPM | OXYGEN SATURATION: 97 % | SYSTOLIC BLOOD PRESSURE: 138 MMHG

## 2023-01-24 DIAGNOSIS — Z12.11 SCREEN FOR COLON CANCER: Primary | ICD-10-CM

## 2023-01-24 DIAGNOSIS — G47.33 OBSTRUCTIVE SLEEP APNEA SYNDROME: ICD-10-CM

## 2023-01-24 DIAGNOSIS — E11.65 TYPE 2 DIABETES MELLITUS WITH HYPERGLYCEMIA, WITHOUT LONG-TERM CURRENT USE OF INSULIN (HCC): ICD-10-CM

## 2023-01-24 DIAGNOSIS — E01.0 THYROMEGALY: ICD-10-CM

## 2023-01-24 DIAGNOSIS — F41.9 ANXIETY: ICD-10-CM

## 2023-01-24 DIAGNOSIS — E78.2 MIXED HYPERLIPIDEMIA: ICD-10-CM

## 2023-01-24 PROBLEM — H61.21 IMPACTED CERUMEN OF RIGHT EAR: Status: RESOLVED | Noted: 2022-11-01 | Resolved: 2023-01-24

## 2023-01-24 PROBLEM — J06.9 VIRAL URI: Status: RESOLVED | Noted: 2022-11-01 | Resolved: 2023-01-24

## 2023-01-24 PROBLEM — M54.12 CERVICAL RADICULITIS: Status: RESOLVED | Noted: 2020-05-11 | Resolved: 2023-01-24

## 2023-01-24 PROCEDURE — 99214 OFFICE O/P EST MOD 30 MIN: CPT | Performed by: INTERNAL MEDICINE

## 2023-01-24 RX ORDER — ALPRAZOLAM 0.25 MG/1
TABLET ORAL
Qty: 90 TABLET | Refills: 2 | Status: SHIPPED | OUTPATIENT
Start: 2023-01-24 | End: 2024-01-24

## 2023-01-24 ASSESSMENT — ENCOUNTER SYMPTOMS
SORE THROAT: 0
ABDOMINAL PAIN: 0
COLOR CHANGE: 0
BACK PAIN: 0
CHEST TIGHTNESS: 0
SHORTNESS OF BREATH: 0
CONSTIPATION: 0
NAUSEA: 0
DIARRHEA: 0
COUGH: 0

## 2023-01-24 ASSESSMENT — PATIENT HEALTH QUESTIONNAIRE - PHQ9
SUM OF ALL RESPONSES TO PHQ QUESTIONS 1-9: 0
SUM OF ALL RESPONSES TO PHQ9 QUESTIONS 1 & 2: 0
SUM OF ALL RESPONSES TO PHQ QUESTIONS 1-9: 0
2. FEELING DOWN, DEPRESSED OR HOPELESS: 0
SUM OF ALL RESPONSES TO PHQ QUESTIONS 1-9: 0
1. LITTLE INTEREST OR PLEASURE IN DOING THINGS: 0
SUM OF ALL RESPONSES TO PHQ QUESTIONS 1-9: 0

## 2023-01-24 NOTE — ASSESSMENT & PLAN NOTE
not at goal.  Hemoglobin A1c and fasting blood sugar have increased.   He is to watch his diet more carefully, continue his metformin 500 mg with breakfast, monitor his fasting blood sugars and we will check his A1c at his next visit

## 2023-01-24 NOTE — PROGRESS NOTES
19    Name: Maribel Wong     :1965      Sex:male       Age : 65 y.o. Chief Complaint   Patient presents with    Diabetes          He was diagnosed with type 2 diabetes mellitus . His hemoglobin A1c In 2020 was 7.1% his fasting blood sugar was 138. [place] He opted to try diet and exercise first and is been watching his diet. He has lost 14 pounds since his last office visit. In 2021 his hemoglobin A1c was down to 6.8%. He was started on Metformin 500 mg with breakfast.  His hemoglobin A1c in 2021 was 6.3%. He has lost another about 15 pounds in the last 3 months. Hemoglobin A1c in 2022 was 6.5%. He was not excreting protein in his urine  Sometimes when he skips meals he gets hypoglycemic. Most the time his fasting blood sugars around 120. I have asked him to not skip meals and eat appropriately continue his diet and medications. He had his diabetic eye examination at [facility]. On 40 mg of simvastatin daily his total cholesterol was 204. His triglycerides were 158, LDL cholesterol was 127 and HDL cholesterol is 47. Fasting blood sugar was 118. This was a little higher than before. Kidney and liver blood work was normal.  No excess protein in his urine. PSA was 0.38  Refuses colonoscopy, will get him a fit test.      He refuses other immunizations or flu shots. He got COVID-19 vaccine series. He had his COVID-19 booster. He had his Pneumovax     Patient has a history of anxiety. He takes alprazolam on a regular basis 0.25 mg 3 times daily. We discussed the use of medications such as Cymbalta or Effexor which he says he can take based on his gene study. Other ones that he is not supposed to take include all the regular ones such as Wellbutrin Zoloft Paxil Prozac Lexapro and Celexa. He signed his medication agreement. This was reviewed with him. We reviewed his controlled substance monitoring.     He is aware of the possible medication side effects tolerance, addiction. He does not want to go on any other medications at this time. No sign of drug seeking or diversion identified. OARRS report reviewed and he last filled his alprazolam 0.25 mg on , January 23, 2023. He will get a prescription today but will not get it filled until February 23, 2023.     he last saw his urologist about a year ago. He had a penile implant about a year ago which works well. He has a history of obstructive sleep apnea and uses a CPAP nightly. He uses this nightly and does well with it    . Review of Systems   Constitutional:  Negative for fatigue and fever. HENT:  Negative for congestion, dental problem, sneezing and sore throat. Respiratory:  Negative for cough, chest tightness and shortness of breath. Cardiovascular:  Negative for chest pain, palpitations and leg swelling. Gastrointestinal:  Negative for abdominal pain, constipation, diarrhea and nausea. Endocrine: Negative for cold intolerance and heat intolerance. Genitourinary:  Negative for difficulty urinating, dysuria, hematuria and urgency. Musculoskeletal:  Negative for arthralgias and back pain. Skin:  Negative for color change and pallor. Neurological:  Negative for dizziness, seizures, weakness, light-headedness, numbness and headaches. Hematological:  Does not bruise/bleed easily. Psychiatric/Behavioral:  Negative for confusion and sleep disturbance. The patient is not nervous/anxious.          Current Outpatient Medications:     ALPRAZolam (XANAX) 0.25 MG tablet, TAKE 1 TABLET BY MOUTH 3 TIMES DAILY AS NEEDED FOR ANXIETY FOR UP TO 30 DAYS, Disp: 90 tablet, Rfl: 2    metFORMIN (GLUCOPHAGE) 500 MG tablet, Take 1 tablet by mouth daily (with breakfast), Disp: 90 tablet, Rfl: 1    simvastatin (ZOCOR) 40 MG tablet, TAKE 1 TABLET BY MOUTH EVERY DAY AT NIGHT, Disp: 90 tablet, Rfl: 1    NONFORMULARY, CBD oil, Disp: , Rfl:   Medications Discontinued During This Encounter Medication Reason    ALPRAZolam (XANAX) 0.25 MG tablet REORDER         Allergies   Allergen Reactions    Buspar [Buspirone]     Lexapro  [Escitalopram Oxalate]     Venlafaxine     Zoloft  [Sertraline Hcl]        Past Medical History:   Diagnosis Date    Anxiety     Erectile dysfunction     Hyperlipidemia     Hypogonadism in male     Sleep apnea       Immunization History   Administered Date(s) Administered    COVID-19, PFIZER PURPLE top, DILUTE for use, (age 15 y+), 30mcg/0.3mL 03/28/2021, 04/19/2021, 12/07/2021    Pneumococcal Polysaccharide ([id number]) 10/27/2021    Zoster Recombinant (Shingrix) 07/07/2022        Vitals:    01/24/23 0802   BP: 138/78   Pulse: 83   Temp: 97.9 °F (36.6 °C)   SpO2: 97%   Weight: 224 lb (101.6 kg)   Height: 5' 10\" (1.778 m)       Physical Exam  Vitals reviewed. Constitutional:       General: He is not in acute distress. Appearance: Normal appearance. He is well-developed. He is obese. He is not ill-appearing. HENT:      Head: Normocephalic and atraumatic. Right Ear: External ear normal.      Left Ear: External ear normal.      Mouth/Throat:      Comments: Clear postnasal drainage  Eyes:      General: No scleral icterus. Right eye: No discharge. Left eye: No discharge. Conjunctiva/sclera: Conjunctivae normal.   Neck:      Thyroid: Thyromegaly present. Vascular: No JVD. Comments: Thyroid very slightly enlarged  Cardiovascular:      Rate and Rhythm: Normal rate and regular rhythm. Heart sounds: No murmur heard. Pulmonary:      Effort: Pulmonary effort is normal.      Breath sounds: Normal breath sounds. No wheezing or rales. Abdominal:      General: Bowel sounds are normal. There is no distension. Palpations: Abdomen is soft. Tenderness: There is no abdominal tenderness. Musculoskeletal:         General: No deformity. Normal range of motion. Cervical back: Normal range of motion and neck supple.    Lymphadenopathy: Cervical: No cervical adenopathy. Skin:     General: Skin is warm and dry. Coloration: Skin is not pale. Findings: No erythema. Neurological:      General: No focal deficit present. Mental Status: He is alert and oriented to person, place, and time. Motor: No weakness or abnormal muscle tone. Gait: Gait normal.      Deep Tendon Reflexes: Reflexes normal.   Psychiatric:         Mood and Affect: Mood normal.         Behavior: Behavior normal.         Judgment: Judgment normal.        Problem List          Respiratory    Sleep apnea       at goal, uses his machine nightly            Endocrine    Thyromegaly       discussed thyromegaly with the patient. Discussed overactive and underactive thyroid. We will need to do a free T4 and TSH on him and possibly an ultrasound of his thyroid. Patient prefers to do it during his next office visit. Type 2 diabetes mellitus with hyperglycemia, without long-term current use of insulin (HCC)       not at goal.  Hemoglobin A1c and fasting blood sugar have increased. He is to watch his diet more carefully, continue his metformin 500 mg with breakfast, monitor his fasting blood sugars and we will check his A1c at his next visit         Relevant Medications    metFORMIN (GLUCOPHAGE) 500 MG tablet       Other    Anxiety       at goal on alprazolam 0.25 mg 3 times daily. OARRS report reviewed and his use is appropriate. He knows not to get his alprazolam filled till February 23. Prescription for alprazolam given         Relevant Medications    ALPRAZolam (XANAX) 0.25 MG tablet    Hyperlipidemia       not at goal.  Watch diet more carefully. Continue his simvastatin 40 mg nightly.   We will check lipids next office visit         Relevant Medications    simvastatin (ZOCOR) 40 MG tablet    Screen for colon cancer - Primary       patient agreeable to doing a fit test         Relevant Orders    POCT Fecal Immunochemical Test (FIT)     Return in 3 months for medication refill and monitoring. DM and HL    Seen by:   Rohini Galvin DO

## 2023-01-24 NOTE — ASSESSMENT & PLAN NOTE
not at goal.  Watch diet more carefully. Continue his simvastatin 40 mg nightly.   We will check lipids next office visit

## 2023-01-24 NOTE — ASSESSMENT & PLAN NOTE
discussed thyromegaly with the patient. Discussed overactive and underactive thyroid. We will need to do a free T4 and TSH on him and possibly an ultrasound of his thyroid. Patient prefers to do it during his next office visit.

## 2023-01-24 NOTE — ASSESSMENT & PLAN NOTE
at goal on alprazolam 0.25 mg 3 times daily. OARRS report reviewed and his use is appropriate. He knows not to get his alprazolam filled till February 23.   Prescription for alprazolam given

## 2023-02-23 PROBLEM — Z12.11 SCREEN FOR COLON CANCER: Status: RESOLVED | Noted: 2021-07-21 | Resolved: 2023-02-23

## 2023-04-20 ENCOUNTER — OFFICE VISIT (OUTPATIENT)
Dept: PRIMARY CARE CLINIC | Age: 58
End: 2023-04-20
Payer: COMMERCIAL

## 2023-04-20 VITALS
HEIGHT: 70 IN | OXYGEN SATURATION: 98 % | BODY MASS INDEX: 32.07 KG/M2 | SYSTOLIC BLOOD PRESSURE: 136 MMHG | HEART RATE: 95 BPM | DIASTOLIC BLOOD PRESSURE: 78 MMHG | TEMPERATURE: 98 F | WEIGHT: 224 LBS

## 2023-04-20 DIAGNOSIS — F41.9 ANXIETY: ICD-10-CM

## 2023-04-20 DIAGNOSIS — Z12.11 SCREEN FOR COLON CANCER: ICD-10-CM

## 2023-04-20 DIAGNOSIS — E78.2 MIXED HYPERLIPIDEMIA: ICD-10-CM

## 2023-04-20 DIAGNOSIS — E11.65 TYPE 2 DIABETES MELLITUS WITH HYPERGLYCEMIA, WITHOUT LONG-TERM CURRENT USE OF INSULIN (HCC): Primary | ICD-10-CM

## 2023-04-20 PROCEDURE — 99214 OFFICE O/P EST MOD 30 MIN: CPT | Performed by: INTERNAL MEDICINE

## 2023-04-20 RX ORDER — LANCETS 30 GAUGE
1 EACH MISCELLANEOUS DAILY
Qty: 100 EACH | Refills: 1 | Status: SHIPPED | OUTPATIENT
Start: 2023-04-20

## 2023-04-20 RX ORDER — SIMVASTATIN 40 MG
TABLET ORAL
Qty: 90 TABLET | Refills: 1 | Status: SHIPPED | OUTPATIENT
Start: 2023-04-20

## 2023-04-20 RX ORDER — ALPRAZOLAM 0.5 MG/1
TABLET ORAL
Qty: 45 TABLET | Refills: 2 | Status: SHIPPED | OUTPATIENT
Start: 2023-04-20 | End: 2024-04-19

## 2023-04-20 RX ORDER — GLUCOSAMINE HCL/CHONDROITIN SU 500-400 MG
CAPSULE ORAL
Qty: 100 STRIP | Refills: 1 | Status: SHIPPED | OUTPATIENT
Start: 2023-04-20

## 2023-04-20 RX ORDER — ALPRAZOLAM 0.25 MG/1
TABLET ORAL
Qty: 90 TABLET | Refills: 2 | Status: SHIPPED
Start: 2023-04-20 | End: 2023-04-20 | Stop reason: SDUPTHER

## 2023-04-20 SDOH — ECONOMIC STABILITY: HOUSING INSECURITY
IN THE LAST 12 MONTHS, WAS THERE A TIME WHEN YOU DID NOT HAVE A STEADY PLACE TO SLEEP OR SLEPT IN A SHELTER (INCLUDING NOW)?: NO

## 2023-04-20 SDOH — ECONOMIC STABILITY: FOOD INSECURITY: WITHIN THE PAST 12 MONTHS, YOU WORRIED THAT YOUR FOOD WOULD RUN OUT BEFORE YOU GOT MONEY TO BUY MORE.: NEVER TRUE

## 2023-04-20 SDOH — ECONOMIC STABILITY: INCOME INSECURITY: HOW HARD IS IT FOR YOU TO PAY FOR THE VERY BASICS LIKE FOOD, HOUSING, MEDICAL CARE, AND HEATING?: NOT HARD AT ALL

## 2023-04-20 SDOH — ECONOMIC STABILITY: FOOD INSECURITY: WITHIN THE PAST 12 MONTHS, THE FOOD YOU BOUGHT JUST DIDN'T LAST AND YOU DIDN'T HAVE MONEY TO GET MORE.: NEVER TRUE

## 2023-04-20 ASSESSMENT — ENCOUNTER SYMPTOMS
DIARRHEA: 0
COUGH: 0
SHORTNESS OF BREATH: 0
CONSTIPATION: 0
BACK PAIN: 0
SORE THROAT: 0
NAUSEA: 0
CHEST TIGHTNESS: 0
COLOR CHANGE: 0
ABDOMINAL PAIN: 0

## 2023-04-20 NOTE — ASSESSMENT & PLAN NOTE
not at goal.  LDL still too high. He promises to watch his diet more carefully and continue simvastatin 40 mg daily.   Check lipid panel

## 2023-04-20 NOTE — PROGRESS NOTES
Cancer Screening)     Return in 3 months for medication refill and monitoring. DM and HL    Seen by:   Aleksandr Walter DO

## 2023-04-20 NOTE — ASSESSMENT & PLAN NOTE
at goal.  Her last hemoglobin A1c was 6.5%.   Continue his metformin 500 mg with breakfast.  Prescription sent for monitoring strips and lancets  Check fasting CMP and hemoglobin A1c

## 2023-04-20 NOTE — TELEPHONE ENCOUNTER
Pharmacy sent fax stating that they still do not have the .25 dosage available so I pended the .5 dose and changed the sig to say 1/2 tab up to 3 times a day. This is how they had to fill it last time.

## 2023-04-20 NOTE — ASSESSMENT & PLAN NOTE
at goal with present dose of alprazolam.  OARRS report reviewed and his use is appropriate. He shows no sign of drug-seeking or drug diversion.   Prescription for alprazolam 0.25 #90 with 2 refills sent to pharmacy

## 2023-05-09 LAB — NONINV COLON CA DNA+OCC BLD SCRN STL QL: NEGATIVE

## 2023-05-20 PROBLEM — Z12.11 SCREEN FOR COLON CANCER: Status: RESOLVED | Noted: 2021-07-21 | Resolved: 2023-05-20

## 2023-06-08 DIAGNOSIS — H93.8X9 EAR CONGESTION, UNSPECIFIED LATERALITY: Primary | ICD-10-CM

## 2023-06-08 RX ORDER — METHYLPREDNISOLONE 4 MG/1
TABLET ORAL
Qty: 1 KIT | Refills: 0 | Status: SHIPPED | OUTPATIENT
Start: 2023-06-08 | End: 2023-06-14

## 2023-06-29 ENCOUNTER — PATIENT MESSAGE (OUTPATIENT)
Dept: PRIMARY CARE CLINIC | Age: 58
End: 2023-06-29

## 2023-06-29 DIAGNOSIS — F41.9 ANXIETY: ICD-10-CM

## 2023-06-29 RX ORDER — ALPRAZOLAM 0.5 MG/1
TABLET ORAL
Qty: 45 TABLET | Refills: 2 | Status: SHIPPED | OUTPATIENT
Start: 2023-06-29 | End: 2024-06-28

## 2023-07-10 ENCOUNTER — TELEPHONE (OUTPATIENT)
Dept: PRIMARY CARE CLINIC | Age: 58
End: 2023-07-10

## 2023-07-10 ENCOUNTER — PATIENT MESSAGE (OUTPATIENT)
Dept: PRIMARY CARE CLINIC | Age: 58
End: 2023-07-10

## 2023-07-10 DIAGNOSIS — E11.65 TYPE 2 DIABETES MELLITUS WITH HYPERGLYCEMIA, WITHOUT LONG-TERM CURRENT USE OF INSULIN (HCC): Primary | ICD-10-CM

## 2023-07-10 RX ORDER — ACYCLOVIR 400 MG/1
1 TABLET ORAL 4 TIMES DAILY
Qty: 1 EACH | Refills: 0 | Status: SHIPPED
Start: 2023-07-10 | End: 2023-07-20

## 2023-07-10 RX ORDER — PROCHLORPERAZINE 25 MG/1
1 SUPPOSITORY RECTAL 4 TIMES DAILY
Qty: 1 EACH | Refills: 0 | Status: SHIPPED
Start: 2023-07-10 | End: 2023-07-20

## 2023-07-10 NOTE — TELEPHONE ENCOUNTER
Patient wants an authorization for a Dexcom. Please order Dexcom and supplies at pharmacy to see what the insurance will pay.

## 2023-07-10 NOTE — TELEPHONE ENCOUNTER
Called the provided number and it was not for an Ronny Palumbo it was for [employer]. He said their diabetes program does not work with continuous glucose monitors. They only do the traditional. I sent a message to the patient letting him know that.

## 2023-07-10 NOTE — TELEPHONE ENCOUNTER
From: Dash Mendez  Sent: 7/10/2023 8:32 AM EDT  To: [facility] Clinical Staff  Subject: CGM    I called [employer] and they said it is covered 100% if requested through their Diabetes Management Program [phone]. If purchased directly through [facility], it would not be covered.

## 2023-07-10 NOTE — TELEPHONE ENCOUNTER
Would you call [facility] who apparently gets CGM's for patient. Find out what is needed for this patient to get a CGM as he is not on insulin. He is on metformin. And should be checking his sugars fasting daily and as needed when he feels hypoglycemic.   Phone number is on the most recent encounter

## 2023-07-20 ENCOUNTER — OFFICE VISIT (OUTPATIENT)
Dept: PRIMARY CARE CLINIC | Age: 58
End: 2023-07-20
Payer: COMMERCIAL

## 2023-07-20 VITALS
SYSTOLIC BLOOD PRESSURE: 128 MMHG | WEIGHT: 226 LBS | BODY MASS INDEX: 32.35 KG/M2 | TEMPERATURE: 97.4 F | HEIGHT: 70 IN | DIASTOLIC BLOOD PRESSURE: 78 MMHG | HEART RATE: 72 BPM | OXYGEN SATURATION: 98 %

## 2023-07-20 DIAGNOSIS — E78.2 MIXED HYPERLIPIDEMIA: ICD-10-CM

## 2023-07-20 DIAGNOSIS — F41.9 ANXIETY: ICD-10-CM

## 2023-07-20 DIAGNOSIS — H61.23 CERUMEN DEBRIS ON TYMPANIC MEMBRANE OF BOTH EARS: ICD-10-CM

## 2023-07-20 DIAGNOSIS — E11.65 TYPE 2 DIABETES MELLITUS WITH HYPERGLYCEMIA, WITHOUT LONG-TERM CURRENT USE OF INSULIN (HCC): Primary | ICD-10-CM

## 2023-07-20 LAB — HBA1C MFR BLD: 6.7 %

## 2023-07-20 PROCEDURE — 83037 HB GLYCOSYLATED A1C HOME DEV: CPT | Performed by: INTERNAL MEDICINE

## 2023-07-20 PROCEDURE — 99214 OFFICE O/P EST MOD 30 MIN: CPT | Performed by: INTERNAL MEDICINE

## 2023-07-20 ASSESSMENT — ENCOUNTER SYMPTOMS
NAUSEA: 0
COLOR CHANGE: 0
CONSTIPATION: 0
BACK PAIN: 0
DIARRHEA: 0
COUGH: 0
CHEST TIGHTNESS: 0
SORE THROAT: 0
SHORTNESS OF BREATH: 0
ABDOMINAL PAIN: 0

## 2023-07-20 NOTE — PROGRESS NOTES
regular basis 0.25 mg 3 times daily. We discussed the use of medications such as Cymbalta or Effexor which he says he can take based on his gene study. Other ones that he is not supposed to take include all the regular ones such as Wellbutrin Zoloft Paxil Prozac Lexapro and Celexa. He signed his medication agreement. This was reviewed with him. We reviewed his controlled substance monitoring. He is aware of the possible medication side effects tolerance, addiction. He does not want to go on any other medications at this time. No sign of drug seeking or diversion identified. OARRS report reviewed and he last filled his alprazolam 0.25 mg on June 29, , 2023. He had a penile implant  which works well. .  He has no lower urinary tract systems at his last screening PSA was normal.      He has a history of obstructive sleep apnea and uses a CPAP nightly. He uses this nightly and does well with it    . Review of Systems   Constitutional:  Negative for fatigue and fever. HENT:  Negative for congestion, dental problem, sneezing and sore throat. He complained of feeling of fullness in his left ear which was relieved with steroids. It is always his left ear that has problems. Respiratory:  Negative for cough, chest tightness and shortness of breath. Cardiovascular:  Negative for chest pain, palpitations and leg swelling. Gastrointestinal:  Negative for abdominal pain, constipation, diarrhea and nausea. Endocrine: Negative for cold intolerance and heat intolerance. Genitourinary:  Negative for difficulty urinating, dysuria, hematuria and urgency. Musculoskeletal:  Negative for arthralgias and back pain. Skin:  Negative for color change and pallor. Neurological:  Negative for dizziness, seizures, weakness, light-headedness, numbness and headaches. Hematological:  Does not bruise/bleed easily. Psychiatric/Behavioral:  Negative for confusion and sleep disturbance.  The patient

## 2023-07-20 NOTE — ASSESSMENT & PLAN NOTE
not at goal.  Watch diet and continue simvastatin 40 mg daily. Return for fasting lipid profile and if his lipids are still not at goal consider either increasing simvastatin dose or adding ezetimibe 10 mg. When he was tried on other statins he had some muscle aches and pains but he has been able to tolerate this dose of simvastatin.

## 2023-07-20 NOTE — ASSESSMENT & PLAN NOTE
OARRS report reviewed and his use is appropriate. He does not need a refill yet. Chronic medication checkpoints reviewed and he shows no signs of drug-seeking or drug diversion. He is aware of the addictive side effects of alprazolam.  He has no problems with hypersomnolence or other neurological problems.

## 2023-07-20 NOTE — ASSESSMENT & PLAN NOTE
not at goal.  Check hemoglobin A1c today. Discussed reasons why continuous glucose monitor is not appropriate in his situation. Switch metformin 500 mg to evening. If morning blood sugars are still above 150 we can add 500 mg of metformin in the morning. Continue monitoring his fasting blood sugars as before.   [facility] CMP

## 2023-07-23 ENCOUNTER — PATIENT MESSAGE (OUTPATIENT)
Dept: PRIMARY CARE CLINIC | Age: 58
End: 2023-07-23

## 2023-07-24 DIAGNOSIS — H91.92 HEARING PROBLEM OF LEFT EAR: ICD-10-CM

## 2023-07-24 DIAGNOSIS — H61.22 IMPACTED CERUMEN OF LEFT EAR: Primary | ICD-10-CM

## 2023-07-24 NOTE — TELEPHONE ENCOUNTER
From: Mercedes Yousif  To: Dr. Ortiz Hale: 7/23/2023 9:59 AM EDT  Subject: ENT    My left ear is clogged again. Can you refer me to an ENT doctor?

## 2023-07-26 DIAGNOSIS — E11.65 TYPE 2 DIABETES MELLITUS WITH HYPERGLYCEMIA, WITHOUT LONG-TERM CURRENT USE OF INSULIN (HCC): ICD-10-CM

## 2023-07-27 ENCOUNTER — OFFICE VISIT (OUTPATIENT)
Dept: ENT CLINIC | Age: 58
End: 2023-07-27
Payer: COMMERCIAL

## 2023-07-27 VITALS
DIASTOLIC BLOOD PRESSURE: 77 MMHG | OXYGEN SATURATION: 100 % | HEART RATE: 68 BPM | BODY MASS INDEX: 32.21 KG/M2 | SYSTOLIC BLOOD PRESSURE: 139 MMHG | RESPIRATION RATE: 18 BRPM | WEIGHT: 225 LBS | HEIGHT: 70 IN

## 2023-07-27 DIAGNOSIS — H91.92 DECREASED HEARING OF LEFT EAR: ICD-10-CM

## 2023-07-27 DIAGNOSIS — H61.22 IMPACTED CERUMEN OF LEFT EAR: Primary | ICD-10-CM

## 2023-07-27 PROCEDURE — 69210 REMOVE IMPACTED EAR WAX UNI: CPT

## 2023-07-27 PROCEDURE — 99203 OFFICE O/P NEW LOW 30 MIN: CPT

## 2023-07-27 ASSESSMENT — ENCOUNTER SYMPTOMS
BACK PAIN: 0
SORE THROAT: 0
SHORTNESS OF BREATH: 0
SINUS PRESSURE: 0
EYE PAIN: 0
EYE DISCHARGE: 0
COUGH: 0
ALLERGIC/IMMUNOLOGIC NEGATIVE: 1
DIARRHEA: 0
VOMITING: 0
RHINORRHEA: 0

## 2023-07-27 NOTE — PATIENT INSTRUCTIONS
Use Debrox     -5 drops to the left ear , allow to soak for 10-15 minutes then flush with warm water.      Repeat for the next 4 days then biweekly

## 2023-07-27 NOTE — PROGRESS NOTES
Subjective:      Patient ID:  Dara Worthy is a 65 y.o. male. HPI:    Cerumen Impaction  Patient presents with diminished hearing both ears for the past 1 year. There is a prior history of cerumen impaction. The patient was not using ear drops to loosen wax immediately prior to this visit. Hearing Loss  Patient presents today with complaints of hearing loss. Concern regarding hearing has been present for 1 year. He has not failed a prior hearing test.  The patient reports difficulty hearing, saying \"huh\" or \"what\". Was cleaned at PCP and had ears cleaned about 6 months ago. Was told a few weeks ago no cerumen was noted. Noted decreased hearing of the left ear over the past week.      Past Medical History:   Diagnosis Date    Anxiety     Erectile dysfunction     Hyperlipidemia     Hypogonadism in male     Sleep apnea      Past Surgical History:   Procedure Laterality Date    PENILE PROSTHESIS       Family History   Problem Relation Age of Onset    Elevated Lipids Mother     Other Cancer Mother     Lymphoma Father     Diabetes Brother      Social History     Socioeconomic History    Marital status:      Spouse name: None    Number of children: None    Years of education: None    Highest education level: None   Tobacco Use    Smoking status: Never    Smokeless tobacco: Never   Substance and Sexual Activity    Alcohol use: Yes    Drug use: Never    Sexual activity: Yes     Partners: Female     Social Determinants of Health     Financial Resource Strain: Low Risk     Difficulty of Paying Living Expenses: Not hard at all   Food Insecurity: No Food Insecurity    Worried About Running Out of Food in the Last Year: Never true    Ran Out of Food in the Last Year: Never true   Transportation Needs: Unknown    Lack of Transportation (Non-Medical): No   Housing Stability: Unknown    Unstable Housing in the Last Year: No     Allergies   Allergen Reactions    Buspar [Buspirone]     Lexapro  [Escitalopram

## 2023-11-19 DIAGNOSIS — E78.2 MIXED HYPERLIPIDEMIA: ICD-10-CM

## 2023-11-20 RX ORDER — SIMVASTATIN 40 MG
TABLET ORAL
Qty: 90 TABLET | Refills: 1 | Status: SHIPPED | OUTPATIENT
Start: 2023-11-20

## 2023-11-20 NOTE — TELEPHONE ENCOUNTER
Last Appointment:  7/20/2023  Future Appointments   Date Time Provider [address]   12/19/2023  2:00 PM [place], [place], [address] [facility]

## 2024-01-09 DIAGNOSIS — F41.9 ANXIETY: ICD-10-CM

## 2024-01-09 RX ORDER — ALPRAZOLAM 0.5 MG/1
TABLET ORAL
Qty: 45 TABLET | Refills: 2 | Status: CANCELLED | OUTPATIENT
Start: 2024-01-09 | End: 2025-01-08

## 2024-01-09 NOTE — TELEPHONE ENCOUNTER
Patient of Dr. Burgess. Patient does not establish with me until 01/30/2024. I will not refill. Thank you.      Also, at [facility] staff, please let patient know that I will not prescribe benzodiazepines. Thank you.

## 2024-01-09 NOTE — TELEPHONE ENCOUNTER
Last Appointment:  12/19/2023  Future Appointments   Date Time Provider Department Center   1/30/2024  8:00 AM Shirley Hines DO [place] [facility]

## 2024-01-10 NOTE — TELEPHONE ENCOUNTER
Pt informed he would need to contact previous physician for Xanax refill.  Pt was also informed Dr. Smith will not prescribe opioids or benzo in the office.  Pt said his medication is very important to him and will cancel his new patient appointment

## 2024-01-18 PROBLEM — Z12.5 SCREENING FOR PROSTATE CANCER: Status: RESOLVED | Noted: 2019-10-09 | Resolved: 2024-01-18

## 2024-01-23 SDOH — HEALTH STABILITY: PHYSICAL HEALTH: ON AVERAGE, HOW MANY MINUTES DO YOU ENGAGE IN EXERCISE AT THIS LEVEL?: 40 MIN

## 2024-01-23 SDOH — HEALTH STABILITY: PHYSICAL HEALTH: ON AVERAGE, HOW MANY DAYS PER WEEK DO YOU ENGAGE IN MODERATE TO STRENUOUS EXERCISE (LIKE A BRISK WALK)?: 3 DAYS

## 2024-01-26 ENCOUNTER — OFFICE VISIT (OUTPATIENT)
Dept: FAMILY MEDICINE CLINIC | Age: 59
End: 2024-01-26
Payer: COMMERCIAL

## 2024-01-26 VITALS
SYSTOLIC BLOOD PRESSURE: 139 MMHG | RESPIRATION RATE: 16 BRPM | WEIGHT: 222 LBS | HEART RATE: 86 BPM | TEMPERATURE: 97.3 F | BODY MASS INDEX: 31.78 KG/M2 | HEIGHT: 70 IN | DIASTOLIC BLOOD PRESSURE: 84 MMHG | OXYGEN SATURATION: 97 %

## 2024-01-26 DIAGNOSIS — E11.65 TYPE 2 DIABETES MELLITUS WITH HYPERGLYCEMIA, WITHOUT LONG-TERM CURRENT USE OF INSULIN (HCC): Primary | ICD-10-CM

## 2024-01-26 DIAGNOSIS — F41.9 ANXIETY: ICD-10-CM

## 2024-01-26 DIAGNOSIS — E78.5 HYPERLIPIDEMIA, UNSPECIFIED HYPERLIPIDEMIA TYPE: ICD-10-CM

## 2024-01-26 LAB — HBA1C MFR BLD: 6.6 %

## 2024-01-26 PROCEDURE — 3044F HG A1C LEVEL LT 7.0%: CPT

## 2024-01-26 PROCEDURE — 99203 OFFICE O/P NEW LOW 30 MIN: CPT

## 2024-01-26 PROCEDURE — 83036 HEMOGLOBIN GLYCOSYLATED A1C: CPT

## 2024-01-26 ASSESSMENT — PATIENT HEALTH QUESTIONNAIRE - PHQ9
SUM OF ALL RESPONSES TO PHQ QUESTIONS 1-9: 1
SUM OF ALL RESPONSES TO PHQ9 QUESTIONS 1 & 2: 1
SUM OF ALL RESPONSES TO PHQ QUESTIONS 1-9: 1
2. FEELING DOWN, DEPRESSED OR HOPELESS: 0
1. LITTLE INTEREST OR PLEASURE IN DOING THINGS: 1
SUM OF ALL RESPONSES TO PHQ QUESTIONS 1-9: 1
SUM OF ALL RESPONSES TO PHQ QUESTIONS 1-9: 1

## 2024-01-26 NOTE — PROGRESS NOTES
[facility]  Precepting Note    Subjective:  New pt  Prior PCP retired    No acute concerns    Chronic issues include     DM2  On metformin  Hemoglobin A1C   Date Value Ref Range Status   07/20/2023 6.7 % Final         HLD  On simvastatin  Failed other statins    MDD/anxiety  Claims has been on multiple SSRI's and is on chronic alprazolam 0.25 mg '  Stressors relate to family and work issues  Feels issues are controlled on present medications  Denies SI or HI      ROS otherwise negative     Past medical, surgical, family and social history were reviewed, non-contributory, and unchanged unless otherwise stated.    Objective:    /84   Pulse 86   Temp 97.3 °F (36.3 °C) (Temporal)   Resp 16   Ht 1.778 m (5' 10\")   Wt 100.7 kg (222 lb)   SpO2 97%   BMI 31.85 kg/m²     Exam is as noted by resident with the following changes, additions or corrections:    General:  NAD; alert & oriented x 3   Heart:  RRR  Lungs:  CTA bilaterally  Abd: bowel sounds present, nontender, nondistended, no masses  Extrem:  No clubbing, cyanosis, or edema  Psych: normal mood/affect     Assessment/Plan:  DM2   Stable, no new issues.  Continue current regimen.   HLD   Stable, no new issues.  Continue current regimen.   KAYLYNN   Is in counseling- continue   Refer to psychiatry   States has enough Xanax for 2 months and will not run out   RTO in 3 mo/prn     Attending Physician Statement  I have reviewed the chart, including any radiology or labs. I have discussed the case, including pertinent history and exam findings with the resident.  I agree with the assessment, plan and orders as documented by the resident.  Please refer to the resident note for additional information.      Electronically signed by Bill Dennis MD on 1/26/2024 at 12:17 PM

## 2024-01-26 NOTE — PROGRESS NOTES
[facility]  Department of Family Medicine  Family Medicine Residency Program      Patient: Kenn Land 58 y.o. male     Date of Service: 24      Chief complaint:   Chief Complaint   Patient presents with    New Patient       HISTORY OF PRESENTING ILLNESS     58 y.o. male presented to the clinic  to establish care    HPI:  here to establish care, PCP retired dec 2023. Last seen in November    No recent illness and or hospitalization, Not acute concerns    PMHx HLD on simvastatin, pre diabetes on metformin checking blood sugar in the morning rarely less than 120, anxiety on alprazolam for last 10 years, ED with surgical implant 7 years ago      Anxiety / depression: feels well managed with current regime, tried THC but not affective (caused him to be sleepy) has tried nearly every SSRI and has not tolerated due to side effects.   Reports side effects to SSRI, SNRI, Amanda  Has seen counseling on and off has upcoming appt or telehealth.  Cat [facility]an - [facility].   Has some family and job stress. Denies SI and HI.    DM:   A1c 6.6% today  Currently on metformin BID   States he exercises regularly   and eatting better than previous.   Morning BG rarely less than 120  Denies unintentional weight loss, blurry vision, nausea, vomiting, abdominal pain, polyuria, polydipsia    Family Hx Mom () bladder cancer and Dad () non-hogkins, brother with Type 1 diabetes    Health Maintenance:  Health Maintenance Due   Topic Date Due    Diabetic foot exam  Never done    Diabetic retinal exam  2023    Diabetic Alb to Cr ratio (uACR) test  2023    Lipids  2023    GFR test (Diabetes, CKD 3-4, OR last GFR 15-59)  2023     Past Medical History:      Diagnosis Date    Anxiety     Erectile dysfunction     Hyperlipidemia     Hypogonadism in male     Sleep apnea      Past Surgical History:        Procedure Laterality Date    PENILE PROSTHESIS       Allergies:

## 2024-01-26 NOTE — PROGRESS NOTES
This patient was screened with SUBS screening tool.   On 1/26/2024 the patient has a Positive Screen and the result can be found scanned into the chart

## 2024-03-07 ENCOUNTER — PATIENT MESSAGE (OUTPATIENT)
Dept: FAMILY MEDICINE CLINIC | Age: 59
End: 2024-03-07

## 2024-03-07 NOTE — TELEPHONE ENCOUNTER
From: Kenn Land  To: Janette Cohen MD  Sent: 3/7/2024 7:59 AM EST  Subject: ENT    Can you refer me to an ENT? Left ear is completely clogged. I made an appt with Abel Baldwin (he took care of this same issue last summer), but the earliest appt they had was 5/1. I'd like to get this cleared up before then in possible

## 2024-03-18 ENCOUNTER — PROCEDURE VISIT (OUTPATIENT)
Dept: AUDIOLOGY | Age: 59
End: 2024-03-18
Payer: COMMERCIAL

## 2024-03-18 ENCOUNTER — OFFICE VISIT (OUTPATIENT)
Dept: ENT CLINIC | Age: 59
End: 2024-03-18
Payer: COMMERCIAL

## 2024-03-18 VITALS
HEIGHT: 70 IN | WEIGHT: 225 LBS | DIASTOLIC BLOOD PRESSURE: 72 MMHG | SYSTOLIC BLOOD PRESSURE: 125 MMHG | OXYGEN SATURATION: 98 % | HEART RATE: 82 BPM | BODY MASS INDEX: 32.21 KG/M2

## 2024-03-18 DIAGNOSIS — H65.193 ACUTE EFFUSION OF BOTH MIDDLE EARS: ICD-10-CM

## 2024-03-18 DIAGNOSIS — H69.93 DYSFUNCTION OF BOTH EUSTACHIAN TUBES: Primary | ICD-10-CM

## 2024-03-18 DIAGNOSIS — H91.92 DECREASED HEARING OF LEFT EAR: Primary | ICD-10-CM

## 2024-03-18 DIAGNOSIS — H69.93 DYSFUNCTION OF BOTH EUSTACHIAN TUBES: ICD-10-CM

## 2024-03-18 PROCEDURE — 92567 TYMPANOMETRY: CPT

## 2024-03-18 PROCEDURE — 99213 OFFICE O/P EST LOW 20 MIN: CPT

## 2024-03-18 RX ORDER — FLUTICASONE PROPIONATE 50 MCG
2 SPRAY, SUSPENSION (ML) NASAL DAILY
Qty: 16 G | Refills: 1 | Status: SHIPPED | OUTPATIENT
Start: 2024-03-18

## 2024-03-18 ASSESSMENT — ENCOUNTER SYMPTOMS
SHORTNESS OF BREATH: 0
BACK PAIN: 0
ALLERGIC/IMMUNOLOGIC NEGATIVE: 1
EYE DISCHARGE: 0
COUGH: 0
SINUS PRESSURE: 0
DIARRHEA: 0
RHINORRHEA: 1
VOMITING: 0
EYE PAIN: 0

## 2024-03-18 NOTE — PROGRESS NOTES
General: Skin is warm and dry.   Neurological:      General: No focal deficit present.      Mental Status: He is alert and oriented to person, place, and time.   Psychiatric:         Attention and Perception: Attention normal.         Mood and Affect: Affect normal.         Behavior: Behavior normal. Behavior is cooperative.         Thought Content: Thought content normal.         Judgment: Judgment normal.       Tympanogram -    Right - Type B, flat    Pressure -      Left   - Type B, flat    Pressure -   Assessment:       Diagnosis Orders   1. Decreased hearing of left ear  Tympanometry      2. Dysfunction of both eustachian tubes  Tympanometry      3. Acute effusion of both middle ears  Tympanometry        Plan:      Kenn is a 58-year-old male patient who returns to the office today for evaluation of suspected cerumen impaction of the left ear.  Patient states over the past 1 month since developing a cold he has been noticing decreased hearing of the left ear which feels very similar to when he had a cerumen impaction approximately 8 months ago.  Upon examination there was no evidence of cerumen impaction bilaterally however, bilateral middle ear effusions were noted.  This was confirmed with tympanogram which revealed a type B, flat curve bilaterally.  At this time I would like the patient to initiate use of Flonase 2 sprays each nostril once daily.  Patient was instructed on proper use of nasal spray and did verbalize understanding was in agreement to this plan.  He was instructed to call the office for any new or worsening symptoms prior to his next appointment.    Follow up in 6 week(s)    RX given today:  Malik Baldwin MSN, FNP-BC  [facility] - Ear, Nose and Throat    The information contained in this note has been dictated using drug and medical speech recognition software and may contain errors

## 2024-03-18 NOTE — PROGRESS NOTES
This patient was referred for tympanometric testing by WOOD Grimes due to eustachian tube dysfunction.     Tympanometry revealed flat tympanograms, bilaterally.    The results were reviewed with the patient.     Recommendations for follow up will be made pending physician consult.    Anaid Gee/CCC-CIDNI  [place] Lic [id number]  Electronically signed by Anaid Gee on 3/18/2024 at 9:48 AM

## 2024-03-22 ENCOUNTER — TELEPHONE (OUTPATIENT)
Dept: FAMILY MEDICINE CLINIC | Age: 59
End: 2024-03-22

## 2024-03-22 ENCOUNTER — PATIENT MESSAGE (OUTPATIENT)
Dept: FAMILY MEDICINE CLINIC | Age: 59
End: 2024-03-22

## 2024-03-22 DIAGNOSIS — F41.9 ANXIETY: ICD-10-CM

## 2024-03-22 RX ORDER — ALPRAZOLAM 0.5 MG/1
TABLET ORAL
Qty: 45 TABLET | Refills: 2 | OUTPATIENT
Start: 2024-03-22 | End: 2025-03-22

## 2024-03-22 NOTE — TELEPHONE ENCOUNTER
----- Message from Kenn Land sent at 3/22/2024 10:13 AM EDT -----  Regarding: Refill  Contact: [phone]  Dr Burgess prescribed my alprazolam in the past. She retired in Dec. I have 1 refill left. [facility] will not refill because Dr Burgess is no longer in their system. Can you contact [facility] to approve this refill? I am currently scheduled to receive this prescription from [facility] in [facility] in the future. Thanks.

## 2024-03-25 NOTE — TELEPHONE ENCOUNTER
From: Kenn Land  To: Janette Cohen MD  Sent: 3/22/2024 10:13 AM EDT  Subject: Refill    Dr Burgess prescribed my alprazolam in the past. She retired in Dec. I have 1 refill left. [facility] will not refill because Dr Burgess is no longer in their system. Can you contact [facility] to approve this refill? I am currently scheduled to receive this prescription from [facility] in [facility] in the future. Thanks.

## 2024-04-20 SDOH — ECONOMIC STABILITY: INCOME INSECURITY: HOW HARD IS IT FOR YOU TO PAY FOR THE VERY BASICS LIKE FOOD, HOUSING, MEDICAL CARE, AND HEATING?: NOT VERY HARD

## 2024-04-20 SDOH — ECONOMIC STABILITY: FOOD INSECURITY: WITHIN THE PAST 12 MONTHS, THE FOOD YOU BOUGHT JUST DIDN'T LAST AND YOU DIDN'T HAVE MONEY TO GET MORE.: NEVER TRUE

## 2024-04-20 SDOH — ECONOMIC STABILITY: FOOD INSECURITY: WITHIN THE PAST 12 MONTHS, YOU WORRIED THAT YOUR FOOD WOULD RUN OUT BEFORE YOU GOT MONEY TO BUY MORE.: NEVER TRUE

## 2024-04-20 SDOH — ECONOMIC STABILITY: TRANSPORTATION INSECURITY
IN THE PAST 12 MONTHS, HAS LACK OF TRANSPORTATION KEPT YOU FROM MEETINGS, WORK, OR FROM GETTING THINGS NEEDED FOR DAILY LIVING?: NO

## 2024-04-23 ENCOUNTER — OFFICE VISIT (OUTPATIENT)
Dept: FAMILY MEDICINE CLINIC | Age: 59
End: 2024-04-23
Payer: COMMERCIAL

## 2024-04-23 VITALS
HEART RATE: 82 BPM | BODY MASS INDEX: 31.62 KG/M2 | SYSTOLIC BLOOD PRESSURE: 111 MMHG | RESPIRATION RATE: 16 BRPM | DIASTOLIC BLOOD PRESSURE: 68 MMHG | HEIGHT: 70 IN | TEMPERATURE: 98.6 F | OXYGEN SATURATION: 98 % | WEIGHT: 220.9 LBS

## 2024-04-23 DIAGNOSIS — E78.5 HYPERLIPIDEMIA, UNSPECIFIED HYPERLIPIDEMIA TYPE: ICD-10-CM

## 2024-04-23 DIAGNOSIS — E11.65 TYPE 2 DIABETES MELLITUS WITH HYPERGLYCEMIA, WITHOUT LONG-TERM CURRENT USE OF INSULIN (HCC): Primary | ICD-10-CM

## 2024-04-23 LAB
ALBUMIN: 4.9 G/DL (ref 3.5–5.2)
ALP BLD-CCNC: 71 U/L (ref 40–129)
ALT SERPL-CCNC: 23 U/L (ref 0–40)
ANION GAP SERPL CALCULATED.3IONS-SCNC: 15 MMOL/L (ref 7–16)
AST SERPL-CCNC: 28 U/L (ref 0–39)
BILIRUB SERPL-MCNC: 0.5 MG/DL (ref 0–1.2)
BUN BLDV-MCNC: 24 MG/DL (ref 6–20)
CALCIUM SERPL-MCNC: 9.7 MG/DL (ref 8.6–10.2)
CHLORIDE BLD-SCNC: 103 MMOL/L (ref 98–107)
CHOLESTEROL: 182 MG/DL
CO2: 21 MMOL/L (ref 22–29)
CREAT SERPL-MCNC: 1.3 MG/DL (ref 0.7–1.2)
CREATININE URINE POCT: 200
GFR SERPL CREATININE-BSD FRML MDRD: 67 ML/MIN/1.73M2
GLUCOSE BLD-MCNC: 110 MG/DL (ref 74–99)
HBA1C MFR BLD: 6.8 %
HCT VFR BLD CALC: 49.6 % (ref 37–54)
HDLC SERPL-MCNC: 42 MG/DL
HEMOGLOBIN: 16.1 G/DL (ref 12.5–16.5)
LDL CHOLESTEROL: 103 MG/DL
MCH RBC QN AUTO: 29.8 PG (ref 26–35)
MCHC RBC AUTO-ENTMCNC: 32.5 G/DL (ref 32–34.5)
MCV RBC AUTO: 91.9 FL (ref 80–99.9)
MICROALBUMIN/CREAT 24H UR: 30 MG/G{CREAT}
MICROALBUMIN/CREAT UR-RTO: <30
PDW BLD-RTO: 13.4 % (ref 11.5–15)
PLATELET # BLD: 234 K/UL (ref 130–450)
PMV BLD AUTO: 11.2 FL (ref 7–12)
POTASSIUM SERPL-SCNC: 4.6 MMOL/L (ref 3.5–5)
RBC # BLD: 5.4 M/UL (ref 3.8–5.8)
SODIUM BLD-SCNC: 139 MMOL/L (ref 132–146)
TOTAL PROTEIN: 7.5 G/DL (ref 6.4–8.3)
TRIGL SERPL-MCNC: 187 MG/DL
VITAMIN B-12: 464 PG/ML (ref 211–946)
VLDLC SERPL CALC-MCNC: 37 MG/DL
WBC # BLD: 7.1 K/UL (ref 4.5–11.5)

## 2024-04-23 PROCEDURE — 82044 UR ALBUMIN SEMIQUANTITATIVE: CPT

## 2024-04-23 PROCEDURE — 3044F HG A1C LEVEL LT 7.0%: CPT

## 2024-04-23 PROCEDURE — 83036 HEMOGLOBIN GLYCOSYLATED A1C: CPT

## 2024-04-23 PROCEDURE — 99213 OFFICE O/P EST LOW 20 MIN: CPT

## 2024-04-23 RX ORDER — HYDROXYZINE HYDROCHLORIDE 10 MG/1
10 TABLET, FILM COATED ORAL
COMMUNITY
Start: 2024-03-29

## 2024-04-23 ASSESSMENT — ENCOUNTER SYMPTOMS
RHINORRHEA: 0
CONSTIPATION: 0
DIARRHEA: 0
SHORTNESS OF BREATH: 0
NAUSEA: 0
COUGH: 0
ABDOMINAL PAIN: 0
EYE PAIN: 0

## 2024-04-23 NOTE — PROGRESS NOTES
[facility]  Department of Family Medicine  Family Medicine Residency Program      Patient: Kenn Land 58 y.o. male     Date of Service: 4/23/24      Chief complaint:   Chief Complaint   Patient presents with    Diabetes       HISTORY OF PRESENTING ILLNESS     58 y.o. male presented to the clinic for diabetes f/u    DM II  Last A1c 6.6 %, today 6.8%  On Metformin 500 mg BID  Morning Blood glucose 120 - 160s fasting   Denies blurry vision, n/v, weight loss  Checks feet daily  Last eye exam last summer - normal  Tries to exercise daily  States that his wife cooks healthy meals but sometimes posttussive 90 or 2 healthy diet      HL  On Simvastatin 40 mg qd  Denies side     Anxiety  Follows with psych      Health Maintenance:  Health Maintenance Due   Topic Date Due    Diabetic foot exam  Never done    Diabetic retinal exam  07/25/2023    Lipids  11/01/2023    GFR test (Diabetes, CKD 3-4, OR last GFR 15-59)  11/01/2023     Past Medical History:      Diagnosis Date    Anxiety     Erectile dysfunction     Hyperlipidemia     Hypogonadism in male     Sleep apnea      Past Surgical History:        Procedure Laterality Date    PENILE PROSTHESIS       Allergies:    Buspar [buspirone], Lexapro  [escitalopram oxalate], Venlafaxine, and Zoloft  [sertraline hcl]  Social History:   Social History     Socioeconomic History    Marital status:      Spouse name: Not on file    Number of children: Not on file    Years of education: Not on file    Highest education level: Not on file   Occupational History    Not on file   Tobacco Use    Smoking status: Never    Smokeless tobacco: Never   Substance and Sexual Activity    Alcohol use: Yes    Drug use: Never    Sexual activity: Yes     Partners: Female   Other Topics Concern    Not on file   Social History Narrative    Not on file     Social Determinants of Health     Financial Resource Strain: Low Risk  (4/20/2024)    Overall Financial Resource Strain 
AM

## 2024-04-24 DIAGNOSIS — E11.65 TYPE 2 DIABETES MELLITUS WITH HYPERGLYCEMIA, WITHOUT LONG-TERM CURRENT USE OF INSULIN (HCC): ICD-10-CM

## 2024-04-24 DIAGNOSIS — E78.2 MIXED HYPERLIPIDEMIA: ICD-10-CM

## 2024-04-24 RX ORDER — SIMVASTATIN 40 MG
40 TABLET ORAL NIGHTLY
Qty: 90 TABLET | Refills: 1 | Status: SHIPPED | OUTPATIENT
Start: 2024-04-24

## 2024-04-24 NOTE — TELEPHONE ENCOUNTER
Last Appointment:  Visit date not found  Future Appointments   Date Time Provider Department Center   5/1/2024  8:30 AM Abel Baldwin APRN - CNP [facility] [facility]

## 2024-05-01 ENCOUNTER — PROCEDURE VISIT (OUTPATIENT)
Dept: AUDIOLOGY | Age: 59
End: 2024-05-01
Payer: COMMERCIAL

## 2024-05-01 ENCOUNTER — OFFICE VISIT (OUTPATIENT)
Dept: ENT CLINIC | Age: 59
End: 2024-05-01
Payer: COMMERCIAL

## 2024-05-01 VITALS
BODY MASS INDEX: 31.5 KG/M2 | HEIGHT: 70 IN | WEIGHT: 220 LBS | HEART RATE: 82 BPM | SYSTOLIC BLOOD PRESSURE: 137 MMHG | DIASTOLIC BLOOD PRESSURE: 73 MMHG

## 2024-05-01 DIAGNOSIS — H69.93 DYSFUNCTION OF BOTH EUSTACHIAN TUBES: Primary | ICD-10-CM

## 2024-05-01 DIAGNOSIS — H91.92 DECREASED HEARING OF LEFT EAR: Primary | ICD-10-CM

## 2024-05-01 DIAGNOSIS — H69.93 DYSFUNCTION OF BOTH EUSTACHIAN TUBES: ICD-10-CM

## 2024-05-01 DIAGNOSIS — J30.9 CHRONIC ALLERGIC RHINITIS: ICD-10-CM

## 2024-05-01 PROCEDURE — 99213 OFFICE O/P EST LOW 20 MIN: CPT

## 2024-05-01 PROCEDURE — 92567 TYMPANOMETRY: CPT | Performed by: AUDIOLOGIST

## 2024-05-01 RX ORDER — AZELASTINE 1 MG/ML
1 SPRAY, METERED NASAL 2 TIMES DAILY
Qty: 30 ML | Refills: 1 | Status: SHIPPED | OUTPATIENT
Start: 2024-05-01

## 2024-05-01 RX ORDER — ALPRAZOLAM 0.25 MG/1
0.25 TABLET ORAL 3 TIMES DAILY PRN
COMMUNITY
Start: 2024-04-26

## 2024-05-01 RX ORDER — FLUTICASONE PROPIONATE 50 MCG
2 SPRAY, SUSPENSION (ML) NASAL DAILY
Qty: 16 G | Refills: 1 | Status: SHIPPED | OUTPATIENT
Start: 2024-05-01

## 2024-05-01 ASSESSMENT — ENCOUNTER SYMPTOMS
RHINORRHEA: 0
SINUS PRESSURE: 0
DIARRHEA: 0
SHORTNESS OF BREATH: 0
BACK PAIN: 0
ALLERGIC/IMMUNOLOGIC NEGATIVE: 1
EYE DISCHARGE: 0
EYE PAIN: 0
SORE THROAT: 0
COUGH: 0
VOMITING: 0

## 2024-05-01 NOTE — PROGRESS NOTES
Subjective:      Patient ID:  Kenn Land is a 58 y.o. male.    HPI:    PT returns for recheck of ear effusion. The effusion has been present for 6 week(s). The patient is  doing well and thinks the hearing is  better.  Pt has been using fluticasone (Flonase) daily. States his right ear feels \"pretty good\" and the left ear feels better but still has muffled hearing.     Past Medical History:   Diagnosis Date    Anxiety     Erectile dysfunction     Hyperlipidemia     Hypogonadism in male     Sleep apnea      Past Surgical History:   Procedure Laterality Date    PENILE PROSTHESIS       Family History   Problem Relation Age of Onset    Elevated Lipids Mother     Other Cancer Mother     Lymphoma Father     Diabetes Brother      Social History     Socioeconomic History    Marital status:      Spouse name: None    Number of children: None    Years of education: None    Highest education level: None   Tobacco Use    Smoking status: Never    Smokeless tobacco: Never   Substance and Sexual Activity    Alcohol use: Yes     Alcohol/week: 3.0 standard drinks of alcohol     Types: 3 Cans of beer per week    Drug use: Never    Sexual activity: Yes     Partners: Female     Social Determinants of Health     Financial Resource Strain: Low Risk  (4/20/2024)    Overall Financial Resource Strain (CARDIA)     Difficulty of Paying Living Expenses: Not very hard   Food Insecurity: No Food Insecurity (4/20/2024)    Hunger Vital Sign     Worried About Running Out of Food in the Last Year: Never true     Ran Out of Food in the Last Year: Never true   Transportation Needs: Unknown (4/20/2024)    PRAPARE - Transportation     Lack of Transportation (Non-Medical): No   Physical Activity: Insufficiently Active (1/23/2024)    Exercise Vital Sign     Days of Exercise per Week: 3 days     Minutes of Exercise per Session: 40 min   Housing Stability: Unknown (4/20/2024)    Housing Stability Vital Sign     Unstable Housing in the Last Year:

## 2024-05-03 NOTE — PROGRESS NOTES
This patient was referred for audiometric/tympanometric testing by WOOD Grimes due to eustachian tube dysfunction.        Tympanometry revealed normal middle ear peak pressure and compliance, in the right ear. Left ear revealed negative pressure (-174 daPa).        The results were reviewed with the patient and CNP.     Recommendations for follow up will be made pending ordering provider consult.    Anaid Boyer/CCC-A  [place] Lic # [id number]

## 2024-06-19 ENCOUNTER — OFFICE VISIT (OUTPATIENT)
Dept: ENT CLINIC | Age: 59
End: 2024-06-19
Payer: COMMERCIAL

## 2024-06-19 ENCOUNTER — PROCEDURE VISIT (OUTPATIENT)
Dept: AUDIOLOGY | Age: 59
End: 2024-06-19
Payer: COMMERCIAL

## 2024-06-19 VITALS
HEIGHT: 70 IN | DIASTOLIC BLOOD PRESSURE: 69 MMHG | WEIGHT: 226 LBS | TEMPERATURE: 97.3 F | SYSTOLIC BLOOD PRESSURE: 105 MMHG | BODY MASS INDEX: 32.35 KG/M2 | HEART RATE: 93 BPM

## 2024-06-19 DIAGNOSIS — H90.A32 MIXED CONDUCTIVE AND SENSORINEURAL HEARING LOSS OF LEFT EAR WITH RESTRICTED HEARING OF RIGHT EAR: Primary | ICD-10-CM

## 2024-06-19 DIAGNOSIS — H90.A21 SENSORINEURAL HEARING LOSS (SNHL) OF RIGHT EAR WITH RESTRICTED HEARING OF LEFT EAR: ICD-10-CM

## 2024-06-19 DIAGNOSIS — H90.A32 MIXED CONDUCTIVE AND SENSORINEURAL HEARING LOSS OF LEFT EAR WITH RESTRICTED HEARING OF RIGHT EAR: ICD-10-CM

## 2024-06-19 DIAGNOSIS — H91.92 DECREASED HEARING OF LEFT EAR: ICD-10-CM

## 2024-06-19 DIAGNOSIS — H91.92 DECREASED HEARING OF LEFT EAR: Primary | ICD-10-CM

## 2024-06-19 PROCEDURE — 92557 COMPREHENSIVE HEARING TEST: CPT | Performed by: AUDIOLOGIST

## 2024-06-19 PROCEDURE — 92567 TYMPANOMETRY: CPT | Performed by: AUDIOLOGIST

## 2024-06-19 PROCEDURE — 99214 OFFICE O/P EST MOD 30 MIN: CPT

## 2024-06-19 RX ORDER — AZELASTINE 1 MG/ML
1 SPRAY, METERED NASAL 2 TIMES DAILY
Qty: 30 ML | Refills: 1 | Status: SHIPPED | OUTPATIENT
Start: 2024-06-19

## 2024-06-19 RX ORDER — FEXOFENADINE HCL 180 MG/1
180 TABLET ORAL DAILY
Qty: 30 TABLET | Refills: 1 | Status: SHIPPED | OUTPATIENT
Start: 2024-06-19

## 2024-06-19 RX ORDER — FLUTICASONE PROPIONATE 50 MCG
2 SPRAY, SUSPENSION (ML) NASAL DAILY
Qty: 16 G | Refills: 1 | Status: SHIPPED | OUTPATIENT
Start: 2024-06-19

## 2024-06-19 ASSESSMENT — ENCOUNTER SYMPTOMS
COUGH: 0
VOMITING: 0
SORE THROAT: 0
EYE PAIN: 0
RHINORRHEA: 0
SINUS PRESSURE: 0
DIARRHEA: 0
EYE DISCHARGE: 0
BACK PAIN: 0
ALLERGIC/IMMUNOLOGIC NEGATIVE: 1
SHORTNESS OF BREATH: 0

## 2024-06-19 NOTE — PROGRESS NOTES
Subjective:      Patient ID:  Kenn Land is a 58 y.o. male.    HPI:    Patient presents today for recheck of the left ear.  Patient is not doing better.  Drops:no  Pain: no  Drainage: no   Wick: no    3 months ago patient was noted to have bilateral middle ear effusion.  6 weeks ago had continued ETD of left ear but effusion had resolved  Since that time patient states he continues to get muffled hearing in the left ear.   Has continued use of Flonase and Astelin twice daily.   Cerumen impaction: yes    Past Medical History:   Diagnosis Date    Anxiety     Erectile dysfunction     Hyperlipidemia     Hypogonadism in male     Sleep apnea      Past Surgical History:   Procedure Laterality Date    PENILE PROSTHESIS       Family History   Problem Relation Age of Onset    Elevated Lipids Mother     Other Cancer Mother     Lymphoma Father     Diabetes Brother      Social History     Socioeconomic History    Marital status:      Spouse name: None    Number of children: None    Years of education: None    Highest education level: None   Tobacco Use    Smoking status: Never    Smokeless tobacco: Never   Substance and Sexual Activity    Alcohol use: Yes     Alcohol/week: 3.0 standard drinks of alcohol     Types: 3 Cans of beer per week    Drug use: Never    Sexual activity: Yes     Partners: Female     Social Determinants of Health     Financial Resource Strain: Low Risk  (4/20/2024)    Overall Financial Resource Strain (CARDIA)     Difficulty of Paying Living Expenses: Not very hard   Food Insecurity: No Food Insecurity (4/20/2024)    Hunger Vital Sign     Worried About Running Out of Food in the Last Year: Never true     Ran Out of Food in the Last Year: Never true   Transportation Needs: Unknown (4/20/2024)    PRAPARE - Transportation     Lack of Transportation (Non-Medical): No   Physical Activity: Insufficiently Active (1/23/2024)    Exercise Vital Sign     Days of Exercise per Week: 3 days     Minutes of

## 2024-06-20 NOTE — PROGRESS NOTES
This patient was referred for audiometric/tympanometric testing by WOOD Grimes due to eustachian tube dysfunction.      Audiometry using pure tone air and bone conduction revealed a mild hearing loss at 250 Hz, hearing sensitivity within normal limits through 2000 Hz sloping to a moderate to mild sensorineural hearing loss in the right ear. Left ear revealed a mild through 1000 Hz sloping to profound mixed hearing loss.   Asymmetric hearing was noted and discussed with provider.  Reliability was good. Speech reception thresholds were in good agreement with the pure tone averages, bilaterally. Speech discrimination scores were excellent, bilaterally.    Tympanometry revealed negative middle ear pressure (-215 daPa), in the left ear and within normal limits peak pressure and compliance in the left ear.         The results were reviewed with the patient and CNP.     Recommendations for follow up will be made pending ordering provider consult.    Anaid Boyer/CCC-A  [place] Lic # [id number]

## 2024-08-02 ENCOUNTER — OFFICE VISIT (OUTPATIENT)
Dept: ENT CLINIC | Age: 59
End: 2024-08-02
Payer: COMMERCIAL

## 2024-08-02 ENCOUNTER — PROCEDURE VISIT (OUTPATIENT)
Dept: AUDIOLOGY | Age: 59
End: 2024-08-02

## 2024-08-02 VITALS
DIASTOLIC BLOOD PRESSURE: 82 MMHG | HEART RATE: 79 BPM | WEIGHT: 227.5 LBS | HEIGHT: 70 IN | SYSTOLIC BLOOD PRESSURE: 143 MMHG | OXYGEN SATURATION: 97 % | BODY MASS INDEX: 32.57 KG/M2

## 2024-08-02 DIAGNOSIS — H69.93 DYSFUNCTION OF BOTH EUSTACHIAN TUBES: Primary | ICD-10-CM

## 2024-08-02 DIAGNOSIS — H91.92 DECREASED HEARING OF LEFT EAR: Primary | ICD-10-CM

## 2024-08-02 DIAGNOSIS — H69.93 DYSFUNCTION OF BOTH EUSTACHIAN TUBES: ICD-10-CM

## 2024-08-02 PROCEDURE — 99213 OFFICE O/P EST LOW 20 MIN: CPT

## 2024-08-02 RX ORDER — AZELASTINE 1 MG/ML
1 SPRAY, METERED NASAL 2 TIMES DAILY
Qty: 30 ML | Refills: 3 | Status: SHIPPED | OUTPATIENT
Start: 2024-08-02

## 2024-08-02 ASSESSMENT — ENCOUNTER SYMPTOMS
SHORTNESS OF BREATH: 0
EYE PAIN: 0
DIARRHEA: 0
EYE DISCHARGE: 0
SORE THROAT: 0
COUGH: 0
SINUS PRESSURE: 0
RHINORRHEA: 0
VOMITING: 0
ALLERGIC/IMMUNOLOGIC NEGATIVE: 1
BACK PAIN: 0

## 2024-08-02 NOTE — PROGRESS NOTES
This patient was referred for audiometric/tympanometric testing by WOOD Grimes due to eustachian tube dysfunction.      Tympanometry revealed negative middle ear pressure (-134 daPa, -240 daPa), bilaterally.          The results were reviewed with the patient and CNP.     Recommendations for follow up will be made pending ordering provider consult.    Anaid Boyer/CCC-A  [place] Lic # [id number]

## 2024-08-02 NOTE — PROGRESS NOTES
Abdomen is soft.   Musculoskeletal:         General: Normal range of motion.      Cervical back: Normal range of motion. No rigidity.   Skin:     General: Skin is warm and dry.   Neurological:      General: No focal deficit present.      Mental Status: He is alert and oriented to person, place, and time.   Psychiatric:         Attention and Perception: Attention normal.         Mood and Affect: Affect normal.         Behavior: Behavior normal. Behavior is cooperative.         Thought Content: Thought content normal.         Judgment: Judgment normal.             Tympanogram -     Tympanogram (my review)-    Right - Type A    Volume - small    Pressure - normal   Left   - Type C  Volume - small    Pressure - negative    Assessment:       Diagnosis Orders   1. Decreased hearing of left ear        2. Dysfunction of both eustachian tubes          Plan:     Kenn is a 58-year-old male who returns to the office today for 6-week recheck of eustachian tube dysfunction and decreased hearing of the left ear.  Per the patient's report he continues to experience muffled hearing and mild ear pressure of the left ear but states that things are tolerable.  I did discuss possible myringotomy versus tube placement for the chronic eustachian tube dysfunction however the patient wishes to think on this option for another 6 weeks and continue medical management.  I would like him to continue his Flonase 1 spray to each nostril twice daily, Astelin spray 2 sprays each nostril twice daily and Allegra 180 mg by mouth daily.  As he continues to experience thick postnasal drainage I gave him a sample of a NeilMed sinus rinse and nasal saline spray to use between now and the next appointment.  He did verbalize understanding and was in agreement to this plan.  He was instructed to call the office for any new or worsening symptoms prior to his next appointment.    Follow up in 6 week(s)    RX given today:  none    Abel Baldwin MSN,

## 2024-08-29 DIAGNOSIS — J30.9 CHRONIC ALLERGIC RHINITIS: Primary | ICD-10-CM

## 2024-08-29 RX ORDER — AZELASTINE 1 MG/ML
1 SPRAY, METERED NASAL 2 TIMES DAILY
Qty: 30 ML | Refills: 3 | Status: SHIPPED | OUTPATIENT
Start: 2024-08-29

## 2024-08-29 RX ORDER — AZELASTINE HYDROCHLORIDE 137 UG/1
SPRAY, METERED NASAL
Qty: 3 EACH | Refills: 3 | Status: CANCELLED | OUTPATIENT
Start: 2024-08-29

## 2024-08-29 NOTE — TELEPHONE ENCOUNTER
LOV with Denny: 8/2/24, NOV: 9/20/24.    Electronically signed by Shirley Vera MA on 8/29/24 at 2:35 PM EDT

## 2024-09-20 ENCOUNTER — PROCEDURE VISIT (OUTPATIENT)
Dept: AUDIOLOGY | Age: 59
End: 2024-09-20

## 2024-09-20 ENCOUNTER — OFFICE VISIT (OUTPATIENT)
Dept: ENT CLINIC | Age: 59
End: 2024-09-20
Payer: COMMERCIAL

## 2024-09-20 ENCOUNTER — PATIENT MESSAGE (OUTPATIENT)
Dept: FAMILY MEDICINE CLINIC | Age: 59
End: 2024-09-20

## 2024-09-20 VITALS
BODY MASS INDEX: 32.47 KG/M2 | SYSTOLIC BLOOD PRESSURE: 130 MMHG | HEIGHT: 70 IN | HEART RATE: 58 BPM | WEIGHT: 226.8 LBS | DIASTOLIC BLOOD PRESSURE: 83 MMHG | OXYGEN SATURATION: 98 %

## 2024-09-20 DIAGNOSIS — H91.92 DECREASED HEARING OF LEFT EAR: ICD-10-CM

## 2024-09-20 DIAGNOSIS — H69.93 DYSFUNCTION OF BOTH EUSTACHIAN TUBES: Primary | ICD-10-CM

## 2024-09-20 PROCEDURE — 99213 OFFICE O/P EST LOW 20 MIN: CPT

## 2024-09-20 RX ORDER — GUAIFENESIN 600 MG/1
1200 TABLET, EXTENDED RELEASE ORAL 2 TIMES DAILY
Qty: 120 TABLET | Refills: 1 | Status: SHIPPED | OUTPATIENT
Start: 2024-09-20

## 2024-09-20 RX ORDER — FLUTICASONE PROPIONATE 50 MCG
2 SPRAY, SUSPENSION (ML) NASAL DAILY
Qty: 48 G | Refills: 1 | Status: SHIPPED | OUTPATIENT
Start: 2024-09-20

## 2024-09-20 RX ORDER — AZELASTINE 1 MG/ML
1 SPRAY, METERED NASAL 2 TIMES DAILY
Qty: 90 ML | Refills: 1 | Status: SHIPPED | OUTPATIENT
Start: 2024-09-20

## 2024-09-20 RX ORDER — FEXOFENADINE HCL 180 MG/1
180 TABLET ORAL DAILY
Qty: 90 TABLET | Refills: 1 | Status: SHIPPED | OUTPATIENT
Start: 2024-09-20

## 2024-09-20 ASSESSMENT — ENCOUNTER SYMPTOMS
SHORTNESS OF BREATH: 0
SORE THROAT: 0
EYE DISCHARGE: 0
EYE PAIN: 0
ALLERGIC/IMMUNOLOGIC NEGATIVE: 1
COUGH: 0
BACK PAIN: 0
RHINORRHEA: 0
VOMITING: 0
DIARRHEA: 0
SINUS PRESSURE: 0

## 2024-10-04 ENCOUNTER — TELEMEDICINE (OUTPATIENT)
Dept: FAMILY MEDICINE CLINIC | Age: 59
End: 2024-10-04

## 2024-10-04 DIAGNOSIS — E11.65 TYPE 2 DIABETES MELLITUS WITH HYPERGLYCEMIA, WITHOUT LONG-TERM CURRENT USE OF INSULIN (HCC): Primary | ICD-10-CM

## 2024-10-04 DIAGNOSIS — E78.2 MIXED HYPERLIPIDEMIA: ICD-10-CM

## 2024-10-04 NOTE — PROGRESS NOTES
[facility]  Precepting Note    Subjective:  Virtual visit    Type 2 Diabetes  Uncontrolled  Last A1C 6.8  Intolerant to higher dose of Metformin    ROS otherwise negative     Past medical, surgical, family and social history were reviewed, non-contributory, and unchanged unless otherwise stated.    Objective:    There were no vitals taken for this visit.        Assessment/Plan:  Type 2 diabetes - add Trulicity, continue statin and metformin. Come to office for next visit so we can get labs.     Attending Physician Statement  I have reviewed the chart, including any radiology or labs. I have discussed the case, including pertinent history and exam findings with the resident.  I agree with the assessment, plan and orders as documented by the resident.  Please refer to the resident note for additional information.      Electronically signed by ANDRE BURNETTE MD on 10/4/2024 at 8:41 AM

## 2024-10-04 NOTE — ASSESSMENT & PLAN NOTE
Hemoglobin A1c 6.8% last visit  Discussed increasing metformin to 1000 mg twice daily, however patient would like to stay at the 500 mg twice daily.  Start Trulicity 0.75 mg weekly.  Can titrate up monthly.  Discussed healthy diet and exercise.  Discussed that patient will need to come into clinic for repeat labs.    Orders:    dulaglutide (TRULICITY) 0.75 MG/0.5ML SOPN SC injection; Inject 0.5 mLs into the skin once a week

## 2024-10-04 NOTE — PROGRESS NOTES
Kenn Land, was evaluated through a synchronous (real-time) audio-video encounter. The patient (or guardian if applicable) is aware that this is a billable service, which includes applicable co-pays. This Virtual Visit was conducted with patient's (and/or legal guardian's) consent. Patient identification was verified, and a caregiver was present when appropriate.   The patient was located at Home: [address] [place] [address] [address]  [address]  [facility] [address]  Provider was located at Facility (Appt Dept): [address] [place] [id number]  Confirm you are appropriately licensed, registered, or certified to deliver care in the state where the patient is located as indicated above. If you are not or unsure, please re-schedule the visit: Yes, I confirm.     Kenn Land (:  1965) is a Established patient, presenting virtually for evaluation of the following:      Below is the assessment and plan developed based on review of pertinent history, physical exam, labs, studies, and medications.     Assessment & Plan  Type 2 diabetes mellitus with hyperglycemia, without long-term current use of insulin ([facility])   Hemoglobin A1c 6.8% last visit  Discussed increasing metformin to 1000 mg twice daily, however patient would like to stay at the 500 mg twice daily.  Start Trulicity 0.75 mg weekly.  Can titrate up monthly.  Discussed healthy diet and exercise.  Discussed that patient will need to come into clinic for repeat labs.    Orders:    dulaglutide (TRULICITY) 0.75 MG/0.5ML SOPN SC injection; Inject 0.5 mLs into the skin once a week    Mixed hyperlipidemia  Currently on Zocor 40 mg daily.  Denies adverse reactions.            Return in about 3 months (around 2025) for Diabetes.       Subjective     59 y.o. male presented to the clinic for diabetes f/u     DM II  Last A1c 6.8%  On Metformin 500 mg BID.  Declined increasing to 1000 mg twice daily due to side effects  Morning Blood glucose 120 - 160s

## 2024-11-15 DIAGNOSIS — E78.2 MIXED HYPERLIPIDEMIA: ICD-10-CM

## 2024-11-15 RX ORDER — SIMVASTATIN 40 MG
40 TABLET ORAL NIGHTLY
Qty: 90 TABLET | Refills: 1 | Status: SHIPPED | OUTPATIENT
Start: 2024-11-15

## 2024-11-15 NOTE — TELEPHONE ENCOUNTER
Name of Medication(s) Requested:  Requested Prescriptions     Pending Prescriptions Disp Refills    simvastatin (ZOCOR) 40 MG tablet 90 tablet 1     Sig: Take 1 tablet by mouth nightly       Medication is on current medication list Yes    Dosage and directions were verified? Yes    Quantity verified: 90 day supply     Pharmacy Verified?  Yes    Last Appointment:  Visit date not found    Future appts:  Future Appointments   Date Time Provider Department Center   12/20/2024  7:45 AM Abel Baldwin APRN - CNP [facility] [facility]   12/20/2024  8:00 AM SCHEDULE, RUFINA SCHREIBER AUDIO [facility]        (If no appt send self scheduling link. .REFILLAPPT)  Scheduling request sent?     [x] Yes  [] No    Does patient need updated?  [x] Yes  [] No

## 2024-12-16 ENCOUNTER — PATIENT MESSAGE (OUTPATIENT)
Dept: FAMILY MEDICINE CLINIC | Age: 59
End: 2024-12-16

## 2024-12-16 DIAGNOSIS — E11.65 TYPE 2 DIABETES MELLITUS WITH HYPERGLYCEMIA, WITHOUT LONG-TERM CURRENT USE OF INSULIN (HCC): ICD-10-CM

## 2024-12-16 NOTE — TELEPHONE ENCOUNTER
Name of Medication(s) Requested:  Requested Prescriptions     Pending Prescriptions Disp Refills    metFORMIN (GLUCOPHAGE) 500 MG tablet 180 tablet 1     Sig: Take 1 tablet by mouth 2 times daily (with meals)       Medication is on current medication list Yes    Dosage and directions were verified? Yes    Quantity verified: 90 day supply     Pharmacy Verified?  Yes    Last Appointment:  Visit date not found    Future appts:  Future Appointments   Date Time Provider Department Center   12/20/2024  8:00 AM SCHEDULE, RUFINA SCHREIBER AUDIO BDM Audio [facility]   1/28/2025  8:30 AM Kenn Flores DO [facility] DE[facility]   2/11/2025  7:30 AM Abel Baldwin APRN - CNP [place] [facility]        (If no appt send self scheduling link. .REFILLAPPT)  Scheduling request sent?     [] Yes  [x] No    Does patient need updated?  [] Yes  [x] No

## 2025-01-25 SDOH — ECONOMIC STABILITY: FOOD INSECURITY: WITHIN THE PAST 12 MONTHS, YOU WORRIED THAT YOUR FOOD WOULD RUN OUT BEFORE YOU GOT MONEY TO BUY MORE.: NEVER TRUE

## 2025-01-25 SDOH — ECONOMIC STABILITY: INCOME INSECURITY: IN THE LAST 12 MONTHS, WAS THERE A TIME WHEN YOU WERE NOT ABLE TO PAY THE MORTGAGE OR RENT ON TIME?: NO

## 2025-01-25 SDOH — ECONOMIC STABILITY: FOOD INSECURITY: WITHIN THE PAST 12 MONTHS, THE FOOD YOU BOUGHT JUST DIDN'T LAST AND YOU DIDN'T HAVE MONEY TO GET MORE.: NEVER TRUE

## 2025-01-25 SDOH — ECONOMIC STABILITY: TRANSPORTATION INSECURITY
IN THE PAST 12 MONTHS, HAS THE LACK OF TRANSPORTATION KEPT YOU FROM MEDICAL APPOINTMENTS OR FROM GETTING MEDICATIONS?: NO

## 2025-01-25 ASSESSMENT — PATIENT HEALTH QUESTIONNAIRE - PHQ9
2. FEELING DOWN, DEPRESSED OR HOPELESS: SEVERAL DAYS
SUM OF ALL RESPONSES TO PHQ QUESTIONS 1-9: 2
SUM OF ALL RESPONSES TO PHQ QUESTIONS 1-9: 2
SUM OF ALL RESPONSES TO PHQ9 QUESTIONS 1 & 2: 2
SUM OF ALL RESPONSES TO PHQ9 QUESTIONS 1 & 2: 2
1. LITTLE INTEREST OR PLEASURE IN DOING THINGS: SEVERAL DAYS
1. LITTLE INTEREST OR PLEASURE IN DOING THINGS: SEVERAL DAYS
2. FEELING DOWN, DEPRESSED OR HOPELESS: SEVERAL DAYS
SUM OF ALL RESPONSES TO PHQ QUESTIONS 1-9: 2
SUM OF ALL RESPONSES TO PHQ QUESTIONS 1-9: 2

## 2025-01-28 ENCOUNTER — OFFICE VISIT (OUTPATIENT)
Dept: FAMILY MEDICINE CLINIC | Age: 60
End: 2025-01-28

## 2025-01-28 VITALS
OXYGEN SATURATION: 98 % | HEART RATE: 73 BPM | RESPIRATION RATE: 16 BRPM | WEIGHT: 230 LBS | BODY MASS INDEX: 33 KG/M2 | TEMPERATURE: 98 F | DIASTOLIC BLOOD PRESSURE: 70 MMHG | SYSTOLIC BLOOD PRESSURE: 118 MMHG

## 2025-01-28 DIAGNOSIS — E11.65 TYPE 2 DIABETES MELLITUS WITH HYPERGLYCEMIA, WITHOUT LONG-TERM CURRENT USE OF INSULIN (HCC): Primary | ICD-10-CM

## 2025-01-28 DIAGNOSIS — E78.2 MIXED HYPERLIPIDEMIA: ICD-10-CM

## 2025-01-28 LAB
CREATININE URINE: 218.6 MG/DL (ref 40–278)
HBA1C MFR BLD: 7.2 %
MICROALBUMIN/CREAT 24H UR: 13 MG/L (ref 0–19)
MICROALBUMIN/CREAT UR-RTO: 6 MCG/MG CREAT (ref 0–30)

## 2025-01-28 ASSESSMENT — ENCOUNTER SYMPTOMS
BLOOD IN STOOL: 0
SHORTNESS OF BREATH: 0
NAUSEA: 0
ABDOMINAL PAIN: 0
VOMITING: 0
COUGH: 0
CONSTIPATION: 0
DIARRHEA: 1

## 2025-01-28 NOTE — PROGRESS NOTES
[facility]  Precepting Note    Subjective:  Diabetes mellitus  Metformin (intolerant of higher doses)  Trulicity(stopped due to nausea)  Tingling in toes. 6 mos.   DM eye exam due   Lab Results   Component Value Date    LABA1C 7.2 01/28/2025    LABA1C 6.8 04/23/2024    LABA1C 6.6 01/26/2024     Lab Results   Component Value Date    CREATININE 1.3 (H) 04/23/2024     Wt Readings from Last 3 Encounters:   01/28/25 104.3 kg (230 lb)   09/20/24 102.9 kg (226 lb 12.8 oz)   08/02/24 103.2 kg (227 lb 8 oz)     The 10-year ASCVD risk score (Santhosh MENDEZ, et al., 2019) is: 12.4%    Values used to calculate the score:      Age: 59 years      Sex: Male      Is Non- : No      Diabetic: Yes      Tobacco smoker: No      Systolic Blood Pressure: 118 mmHg      Is BP treated: No      HDL Cholesterol: 42 mg/dL      Total Cholesterol: 182 mg/dL      ROS otherwise negative     Past medical, surgical, family and social history were reviewed, non-contributory, and unchanged unless otherwise stated.    Objective:    /70   Pulse 73   Temp 98 °F (36.7 °C)   Resp 16   Wt 104.3 kg (230 lb)   SpO2 98%   BMI 33.00 kg/m²     Exam is as noted by resident with the following changes, additions or corrections:    General:  NAD; alert & oriented x 3   Heart:  RRR, no murmurs, gallops, or rubs.  Lungs:  CTA bilaterally, no wheeze, rales or rhonchi  Abd: bowel sounds present, nontender, nondistended, no masses  Extrem:  No clubbing, cyanosis, or edema  Foot exam normal with good sensation.     Assessment/Plan:  DM, slightly worsened with decent overall control.   Start januvia 25mg daily  Cont metformin.   FU in 3 mos     Attending Physician Statement  I have reviewed the chart, including any radiology or labs. I saw patient with resident phys and performed my own physical exam.  I have discussed the case, including pertinent history and exam findings with the resident.  I agree with

## 2025-01-28 NOTE — PROGRESS NOTES
[facility]  Family Medicine Residency  Phone: [phone]  Fax: [phone]    Patient:  Kenn Land 59 y.o. male                                 Date of Service: 1/28/25                            Chiefcomplaint:   Chief Complaint   Patient presents with    Diabetes         History of Present Illness:     The patient is a 59 y.o. male  presented to the clinic with complaints as above.    Type 2 Diabetes  Uncontrolled  Last A1C 6.8 4/23/24, 7.2 here in office  Intolerant to higher dose of Metformin, on 500 mg BID  -Started on trulicity 0.75 mg weekly at last visit in October 2024   - Only took for 3 weeks as it caused nausea  -Blood glucose usually 140 in the AM  -Tingling in toes, started about 6 months ago  -Goes to [facility], last in 1.5 years    No interest in flu vaccine or tetanus booster or diabetes education    Wife is health conscious. Cooks healthy dinners, Lunch time at work, heats up food from [employer]. Open to trying to buy more healthy choices        Past Medical History:      Diagnosis Date    Anxiety     Erectile dysfunction     Hyperlipidemia     Hypogonadism in male     Sleep apnea        Past Surgical History:        Procedure Laterality Date    PENILE PROSTHESIS         Allergies:    Buspar [buspirone], Lexapro  [escitalopram oxalate], Venlafaxine, and Zoloft  [sertraline hcl]    Social History:   Social History     Socioeconomic History    Marital status:      Spouse name: Not on file    Number of children: Not on file    Years of education: Not on file    Highest education level: Not on file   Occupational History    Not on file   Tobacco Use    Smoking status: Never    Smokeless tobacco: Never   Substance and Sexual Activity    Alcohol use: Yes     Alcohol/week: 3.0 standard drinks of alcohol     Types: 3 Cans of beer per week    Drug use: Never    Sexual activity: Yes     Partners: Female   Other Topics Concern    Not on file   Social History

## 2025-02-10 ENCOUNTER — TELEPHONE (OUTPATIENT)
Dept: ENT CLINIC | Age: 60
End: 2025-02-10

## 2025-02-10 NOTE — TELEPHONE ENCOUNTER
Patient called into office to cancel his appointment with Denny 2/11/25, states he will call back in to reschedule.    Electronically signed by Shirley Vera MA on 2/10/25 at 9:52 AM EST

## 2025-05-20 DIAGNOSIS — E78.2 MIXED HYPERLIPIDEMIA: ICD-10-CM

## 2025-05-20 RX ORDER — SIMVASTATIN 40 MG
40 TABLET ORAL NIGHTLY
Qty: 30 TABLET | Refills: 5 | Status: SHIPPED | OUTPATIENT
Start: 2025-05-20

## 2025-05-20 NOTE — TELEPHONE ENCOUNTER
Name of Medication(s) Requested:  Requested Prescriptions     Pending Prescriptions Disp Refills    simvastatin (ZOCOR) 40 MG tablet [Pharmacy Med Name: SIMVASTATIN 40 MG TABLET] 30 tablet 5     Sig: TAKE 1 TABLET BY MOUTH EVERY DAY AT NIGHT       Medication is on current medication list Yes    Dosage and directions were verified? Yes    Quantity verified: 90 day supply     Pharmacy Verified?  Yes    Last Appointment:  1/28/2025    Future appts:  No future appointments.     (If no appt send self scheduling link. .REFILLAPPT)  Scheduling request sent?     [x] Yes  [] No    Does patient need updated?  [x] Yes  [] No

## 2025-06-15 DIAGNOSIS — E78.2 MIXED HYPERLIPIDEMIA: ICD-10-CM

## 2025-06-16 RX ORDER — SIMVASTATIN 40 MG
40 TABLET ORAL NIGHTLY
Qty: 90 TABLET | Refills: 2 | Status: SHIPPED | OUTPATIENT
Start: 2025-06-16

## 2025-06-16 NOTE — TELEPHONE ENCOUNTER
Name of Medication(s) Requested:  Requested Prescriptions     Pending Prescriptions Disp Refills    simvastatin (ZOCOR) 40 MG tablet [Pharmacy Med Name: SIMVASTATIN 40 MG TABLET] 90 tablet 2     Sig: TAKE 1 TABLET BY MOUTH EVERY DAY AT NIGHT       Medication is on current medication list Yes    Dosage and directions were verified? Yes    Quantity verified: 90 day supply     Pharmacy Verified?  Yes    Last Appointment:  1/28/2025    Future appts:  Future Appointments   Date Time Provider Department Center   7/18/2025  2:00 PM Kenn Flores DO [place] [facility]        (If no appt send self scheduling link. .REFILLAPPT)  Scheduling request sent?     [] Yes  [x] No    Does patient need updated?  [x] Yes  [] No

## 2025-06-19 DIAGNOSIS — E11.65 TYPE 2 DIABETES MELLITUS WITH HYPERGLYCEMIA, WITHOUT LONG-TERM CURRENT USE OF INSULIN (HCC): ICD-10-CM

## 2025-06-19 NOTE — TELEPHONE ENCOUNTER
Name of Medication(s) Requested:  Requested Prescriptions     Pending Prescriptions Disp Refills    metFORMIN (GLUCOPHAGE) 500 MG tablet [Pharmacy Med Name: METFORMIN  MG TABLET] 60 tablet 5     Sig: TAKE 1 TABLET BY MOUTH TWICE A DAY WITH MEALS       Medication is on current medication list Yes    Dosage and directions were verified? Yes    Quantity verified: 90 day supply     Pharmacy Verified?  Yes    Last Appointment:  1/28/2025    Future appts:  Future Appointments   Date Time Provider Department Center   7/18/2025  2:00 PM Kenn Flores DO [place] [facility] DE[facility]        (If no appt send self scheduling link. .REFILLAPPT)  Scheduling request sent?     [] Yes  [x] No    Does patient need updated?  [x] Yes  [] No

## 2025-07-18 ENCOUNTER — OFFICE VISIT (OUTPATIENT)
Dept: FAMILY MEDICINE CLINIC | Age: 60
End: 2025-07-18

## 2025-07-18 VITALS
SYSTOLIC BLOOD PRESSURE: 135 MMHG | OXYGEN SATURATION: 98 % | DIASTOLIC BLOOD PRESSURE: 62 MMHG | RESPIRATION RATE: 15 BRPM | TEMPERATURE: 97.4 F | HEIGHT: 70 IN | BODY MASS INDEX: 32.07 KG/M2 | WEIGHT: 224 LBS | HEART RATE: 75 BPM

## 2025-07-18 DIAGNOSIS — E11.65 TYPE 2 DIABETES MELLITUS WITH HYPERGLYCEMIA, WITHOUT LONG-TERM CURRENT USE OF INSULIN (HCC): Primary | ICD-10-CM

## 2025-07-18 DIAGNOSIS — E78.2 MIXED HYPERLIPIDEMIA: ICD-10-CM

## 2025-07-18 DIAGNOSIS — E11.65 TYPE 2 DIABETES MELLITUS WITH HYPERGLYCEMIA, WITHOUT LONG-TERM CURRENT USE OF INSULIN (HCC): ICD-10-CM

## 2025-07-18 LAB
ALBUMIN: 4.4 G/DL (ref 3.5–5.2)
ALP BLD-CCNC: 74 U/L (ref 40–129)
ALT SERPL-CCNC: 25 U/L (ref 0–50)
ANION GAP SERPL CALCULATED.3IONS-SCNC: 15 MMOL/L (ref 7–16)
AST SERPL-CCNC: 24 U/L (ref 0–50)
BASOPHILS ABSOLUTE: 0.04 K/UL (ref 0–0.2)
BASOPHILS RELATIVE PERCENT: 1 % (ref 0–2)
BILIRUB SERPL-MCNC: 0.4 MG/DL (ref 0–1.2)
BUN BLDV-MCNC: 19 MG/DL (ref 6–20)
CALCIUM SERPL-MCNC: 9.9 MG/DL (ref 8.6–10)
CHLORIDE BLD-SCNC: 102 MMOL/L (ref 98–107)
CHOLESTEROL, FASTING: 169 MG/DL
CO2: 25 MMOL/L (ref 22–29)
CREAT SERPL-MCNC: 1.2 MG/DL (ref 0.7–1.2)
EOSINOPHILS ABSOLUTE: 0.14 K/UL (ref 0.05–0.5)
EOSINOPHILS RELATIVE PERCENT: 2 % (ref 0–6)
GFR, ESTIMATED: 71 ML/MIN/1.73M2
GLUCOSE BLD-MCNC: 123 MG/DL (ref 74–99)
HBA1C MFR BLD: 7.2 %
HCT VFR BLD CALC: 45.5 % (ref 37–54)
HDLC SERPL-MCNC: 48 MG/DL
HEMOGLOBIN: 14.9 G/DL (ref 12.5–16.5)
IMMATURE GRANULOCYTES %: 0 % (ref 0–5)
IMMATURE GRANULOCYTES ABSOLUTE: <0.03 K/UL (ref 0–0.58)
LDL CHOLESTEROL: 79 MG/DL
LYMPHOCYTES ABSOLUTE: 1.6 K/UL (ref 1.5–4)
LYMPHOCYTES RELATIVE PERCENT: 24 % (ref 20–42)
MCH RBC QN AUTO: 30 PG (ref 26–35)
MCHC RBC AUTO-ENTMCNC: 32.7 G/DL (ref 32–34.5)
MCV RBC AUTO: 91.7 FL (ref 80–99.9)
MONOCYTES ABSOLUTE: 0.4 K/UL (ref 0.1–0.95)
MONOCYTES RELATIVE PERCENT: 6 % (ref 2–12)
NEUTROPHILS ABSOLUTE: 4.4 K/UL (ref 1.8–7.3)
NEUTROPHILS RELATIVE PERCENT: 67 % (ref 43–80)
PDW BLD-RTO: 13.4 % (ref 11.5–15)
PLATELET # BLD: 213 K/UL (ref 130–450)
PMV BLD AUTO: 11 FL (ref 7–12)
POTASSIUM SERPL-SCNC: 4.4 MMOL/L (ref 3.5–5.1)
RBC # BLD: 4.96 M/UL (ref 3.8–5.8)
SODIUM BLD-SCNC: 142 MMOL/L (ref 136–145)
TOTAL PROTEIN: 7.6 G/DL (ref 6.4–8.3)
TRIGLYCERIDE, FASTING: 213 MG/DL
VLDLC SERPL CALC-MCNC: 43 MG/DL
WBC # BLD: 6.6 K/UL (ref 4.5–11.5)

## 2025-07-18 RX ORDER — ACARBOSE 50 MG/1
50 TABLET ORAL
Qty: 90 TABLET | Refills: 3 | Status: CANCELLED | OUTPATIENT
Start: 2025-07-18

## 2025-07-18 RX ORDER — PIOGLITAZONE 15 MG/1
15 TABLET ORAL DAILY
Qty: 30 TABLET | Refills: 3 | Status: SHIPPED | OUTPATIENT
Start: 2025-07-18

## 2025-07-18 ASSESSMENT — ENCOUNTER SYMPTOMS
COUGH: 0
ABDOMINAL PAIN: 0
DIARRHEA: 1
SHORTNESS OF BREATH: 0
VOMITING: 0
NAUSEA: 0
CONSTIPATION: 0
BLOOD IN STOOL: 0

## 2025-07-18 NOTE — PATIENT INSTRUCTIONS
Try obtain the linaglutid (Tradjenta)   If cannot afford then use the actos  
[FreeTextEntry1] : JANIYA GUILLEN is a 80 year old male who presents for consultation for elevated PSA, BPH, prostate lesion PIRAD = 4 seen on MP MRI prostate 2017 s/p MRI guided prostate biopsy with Dr. Gallardo 3/8/2018 / TRUS 87gms/ path- BPH presents for opinion regarding rising PSA and PIRADS 3 lesion.\par \par Patient presents to office today with chief complaint of dysuria which has been ongoing for 3 weeks.  He reports that the burning sensation with urination is intermittent in nature and feels more exacerbated at night.  He states that he also has a burning sensation at the tip of his penis when he sits down.  He denies any gross hematuria and he denies any fevers.  He denies any 10 out of 10 pains.\par His bladder scan PVR today is 36 mL and his urinalysis dipstick is negative for nitrites, leukocytes, and blood.\par Patient also reports right-sided flank pain.\par Patient is currently on alfuzosin for BPH.\par \par His most recent hemoglobin A1c in February 2022 was 5.6%.  A comprehensive metabolic panel in August 2022 revealed a glucose of 91 and a creatinine of 0.8.\par \par previously \par PSA 05/22 - 4.97 % free 18 \par \par He does not have a family history of prostate cancer. on alpha blocker for bph. \par \par 11/20/2021 mpMR images and compared with prior I= 79 gm, with signif median lobe lesion 1= right posterior lateral apex peripheral zone 5 mm // T2-WI= moderate hypointense focus/mass // DCE negative (no early enhancement) // DWI= hypointense on ADC imaging // PIRADS 3 // artifact secondary to rectal gas, agree w radiologist -- 11/20/2021 this lesion appears slightly more rounded as compared with MRI 2017 however overall minimal change\par And I do not appreciate any hyperintensity on diffusion-weighted imaging\par \par 02/2018 PSA= 4.6\par 02/2020 PSA= 3.7 \par 04/2021 PSA= 3.9 // US-- bilateral renal cysts // bladder vol= 348 cc/ pvr= 100cc \par 10/2021 PSA= 5.9 %FREE= 17 // PSAD= 0.07\par

## 2025-07-18 NOTE — PROGRESS NOTES
[facility]  Family Medicine Residency  Phone: [phone]  Fax: [phone]    Patient:  Kenn Land 59 y.o. male                                 Date of Service: 7/18/25                            Chiefcomplaint:   Chief Complaint   Patient presents with    Diabetes    Other     Left arm tingling         History of Present Illness:     The patient is a 59 y.o. male  presented to the clinic with complaints as above.    Type 2 Diabetes  Uncontrolled  Point-of-care hemoglobin A1c 7.2 on 1/28  Albumin to creatinine ratio within normal limits on 1/28  Intolerant to higher dose of Metformin, on 500 mg BID  -Started on trulicity 0.75 mg weekly at last visit in October 2024              - Only took for 3 weeks as it caused nausea  -Started on sitagliptin 25 mg daily on 1/28    POC 7.2 today    Januvia was a dollar in January. Then 150 the next month, stopped taking.     -Blood glucose usually 130-150 in the AM    -Goes to [facility] eye care, still needs diabetic eye exam     No interest in flu vaccine or tetanus booster or diabetes education      Last visit stated \"Wife is health conscious. Cooks healthy dinners, Lunch time at work, heats up food from [employer]. Open to trying to buy more healthy choices.\"     He is using resistance bands. Believes he has followed a better diet. Is Down 6 pounds from last visit.     Needs to have labs completed    Left lateral forearm katty and needs daily, lasts for seconds. No hx of neck trauma. No hand weakness or palmar numbness.     No interest in colonoscopy. Can repeat cologaurd in 2026    Past Medical History:      Diagnosis Date    Anxiety     Erectile dysfunction     Hyperlipidemia     Hypogonadism in male     Sleep apnea        Past Surgical History:        Procedure Laterality Date    PENILE PROSTHESIS         Allergies:    Buspar [buspirone], Lexapro  [escitalopram oxalate], Venlafaxine, and Zoloft  [sertraline hcl]    Social History:   Social

## 2025-07-18 NOTE — PROGRESS NOTES
S: 59 y.o. male with   Chief Complaint   Patient presents with    Diabetes    Other     Left arm tingling       Diabetes slightly A1c slightly above 7    O: VS:  height is 1.778 m (5' 10\") and weight is 101.6 kg (224 lb). His temporal temperature is 97.4 °F (36.3 °C). His blood pressure is 135/62 and his pulse is 75. His respiration is 15 and oxygen saturation is 98%.   BP Readings from Last 3 Encounters:   07/18/25 135/62   01/28/25 118/70   09/20/24 130/83     See resident note      Impression/Plan:   1) diabetes try to add additional meds to help with control he has been doing his part with diet and lifestyle.  Cost has been prohibitive for certain medicines          Health Maintenance Due   Topic Date Due    HIV screen  Never done    Hepatitis C screen  Never done    Hepatitis B vaccine (1 of 3 - 19+ 3-dose series) Never done    DTaP/Tdap/Td vaccine (1 - Tdap) Never done    Pneumococcal 50+ years Vaccine (2 of 2 - PCV) 10/27/2022    Diabetic retinal exam  07/13/2023    COVID-19 Vaccine (4 - 2024-25 season) 09/01/2024    Lipids  04/23/2025    GFR test (Diabetes, CKD 3-4, OR last GFR 15-59)  04/23/2025         Attending Physician Statement  I have discussed the case, including pertinent history and exam findings with the resident. I agree with the documented assessment and plan.      Kenn Acuña MD

## 2025-07-21 ENCOUNTER — PATIENT MESSAGE (OUTPATIENT)
Dept: FAMILY MEDICINE CLINIC | Age: 60
End: 2025-07-21

## 2025-07-29 ENCOUNTER — PATIENT MESSAGE (OUTPATIENT)
Dept: FAMILY MEDICINE CLINIC | Age: 60
End: 2025-07-29